# Patient Record
(demographics unavailable — no encounter records)

---

## 2020-05-31 NOTE — HP
PRIMARY CARE PHYSICIAN:  Dr. Hudson, [facility].



CHIEF COMPLAINT:  Chest pain.



HISTORY OF PRESENT ILLNESS:  The patient is a 79-year-old male with the past 
medical

history significant for coronary artery disease, CABG x8, last was in 2002,

cardiomyopathy, CHF, hypertension, hyperlipidemia, atrial flutter with an 
ablation

one year ago, obstructive sleep apnea, noncompliant on CPAP, obesity, and

degenerative joint disease, who presents for the above complaint.  The patient

reports that approximately 5:00 this afternoon, while working in his yard, he

developed a sudden onset of chest pain.  The chest pain was located between the

bilateral scapulas of his back and radiated to his chest.  He describes it as

tightness.  It was exacerbated with exertion and relieved by nitroglycerin.  He

reports some associated heart palpitations and tachycardia.  He palpated his 
pulse

and reports heart rate in the 120s.  He took two sublingual nitroglycerins, 
which

helped relieve his chest pain and he had his daughter take him to the ER in 
[place].

 The patient reports chronic swelling to his lower extremities.  He denies any

orthopnea.  He denies any cough or wheezing.  He denies any nausea, vomiting, or

diarrhea.  He denies any recent fever or chills.  He is scheduled for a 
stimulator

insertion on Monday, so he has been off his Plavix for the last 5 days.  He also

reports that this same thing happened to him in March 2020.  He was scheduled 
for

the stimulator to be placed and he had to stop his Plavix and he developed chest

pain.  In the ER in [place], EKG was done, found to be in atrial flutter, left

bundle branch block with some PVCs.  Initial troponin was 0.78, BNP of 384.  
Chest

x-ray showed some mild fluid volume overload.  The patient was given metoprolol 
5 mg

IV push, which dropped his blood pressure.  He was given a bolus of normal 
saline

250 mL.  He was given a full dose aspirin, Zofran, morphine, lovenox 1mg/kg and 
transferred to the

[place] ER.  In the [place] ER, second set troponins were ordered. 

PAST MEDICAL HISTORY:  

1. Coronary artery disease.

2. CHF, cardiomyopathy.

3. Hypertension.

4. Hyperlipidemia.

5. Atrial flutter with ablation one year ago.

6. Obstructive sleep apnea, noncompliant on CPAP.

7. Obesity with the BMI of greater than 30.

8. Degenerative joint disease.

9. BPH.



PAST SURGICAL HISTORY:  

1. Abdominal aortic aneurysm repair in 2018.

2. Coronary artery disease.

3. He had a CABG x8, two times, the last one was in 2002.

4. Right total knee.

5. Left ankle.

6. Hydrocele.



SOCIAL HISTORY:  The patient lives in [place] with his wife at home.  He 
ambulates

without assist.  He was never a smoker.  No ETOH or illicit drug use.  He did 
use

chewing tobacco.  He quit greater than 40 years ago. 



ALLERGIES:  THE PATIENT REPORTS ALLERGY TO HYDROCHLOROTHIAZIDE.



HOME MEDICATIONS:  Unable to reconcile home medications at bedside.



FAMILY HISTORY:  Contributory; positive for cardiac disease.



REVIEW OF SYSTEMS:  All other review of systems are negative unless otherwise 
noted

in the HPI. 



PHYSICAL EXAMINATION:

VITAL SIGNS:  Temperature 97.8, blood pressure 91/59, heart rate 65, 
respirations

19, SpO2 of 97% on room air.  Pain 0/10. 

CONSTITUTIONAL:  The patient is alert, oriented to person, place, and time.  No

acute distress. 

EYES:  PERRLA.  Extraocular muscles intact.  Sclerae nonicteric. 

ENT:  Nares are patent.  Oropharynx is clear.  Uvula midline.  Moist mucous

membranes. 

NECK:  Supple.  Trachea midline.  No JVD. 

CARDIOVASCULAR:  The patient appears to be in atrial flutter with PVCs, 
irregular

rate and rhythm, 3/6 murmur.  No rubs or gallops. 

RESPIRATORY/CHEST:  Respirations are even and nonlabored.  Clear to 
auscultation.

No wheezing, rhonchi, or rales. 

ABDOMEN:  Soft, nontender.  Active bowel sounds.  No rigidity.  No guarding.  No

abdominal bruit auscultated. 

BACK:  Full range of motion.  No CVA tenderness. 

UPPER EXTREMITIES:  Bilateral upper extremities; exam is normal.  Full range of

motion.  Palpable pedal and radial pulses.  Brisk cap refill. 

LOWER EXTREMITIES:  Lower extremities have 2+ pitting edema.  No open lesions.

Palpable pedal pulses.  Brisk cap refill. 

NEUROLOGIC:  GCS of 15.  Able to follow commands.  Oriented to person, place, 
and

time. 



LABORATORY DATA:  EKG was done, showed atrial flutter with a left bundle branch

block.  Troponin was 0.78.  , magnesium of 2.2.  Chest x-ray showed fluid

volume overload.  Sodium 143, potassium 3.4, chloride 107, CO2 of 26, BUN 23,

creatinine 0.99, glucose was 165, calcium 9.1, ALT 18, AST 21, ALP 92, lipase 
of 26.

 PT 13.3, INR 1.0.  WBC 7.2, hemoglobin 13.6, hematocrit 40.3, and platelets 
were

137.  TSH of 2.95. 



IMPRESSION AND PLAN:  

1. Non-ST elevation myocardial infarction.  Admit the patient to the telemetry

floor, inpatient status.  Expected length of stay greater than two midnights.  
The

patient has an extensive history of coronary artery disease and cardiomyopathy.
  He

presented with similar symptoms after being off his Plavix back in March.  The

patient's HEART score is 8.  On exam, the patient was in no distress.  Chest 
pain

was resolved.  We will continue aspirin.  We will trend troponins.  We will 
continue

Lovenox 1 mg/kg.  We will continue home dose statin.  We will consult 
Cardiology. 

2. Congestive heart failure exacerbation.  Chest x-ray showed fluid volume 
overload.

 BNP was 384.  The patient did have an echo in March 2020, showed an EF of 40% 
to

45% with some inferior wall hypokinesis and diastolic dysfunction.  Upon exam, 
the

patient was in no acute distress and the patient did become hypotensive after

receiving metoprolol in the ER in [place].  So for now, we will not diurese.  We

will maintain n.p.o. status.  We will consult Cardiology to perform daily 
weights

and we will fluid restrict and we will consult heart failure clinic and rehab. 

3. Atrial flutter and RVR.  The patient presented with atrial flutter and RVR 
and

was given metoprolol and is now rate controlled in the 90s to 100s.  We will

continue cardiac monitor.  The patient is anticoagulated on Lovenox. 

4. Coronary artery disease.  The patient has an extensive history of CABG with

8-vessel bypass, two separate surgeries, last one was in 2002. 

5. Hyperlipidemia.  The patient had a fasting lipid panel in March 2020.  We 
will

continue home dose statin. 

6. History of atrial flutter.

7. Obesity with a BMI of 31.9.

8. Elevated glucose.  We will check hemoglobin A1c.

9. Protonix for GI prophylaxis.  The patient is anticoagulated on 1 mg/kg 
Lovenox.

10. The patient is a full code.  POA is Adore Mantilla, his spouse at [phone]
[phone].

11. Discussed the case with  __________.







Job ID:  [id number]



DRISS

## 2020-05-31 NOTE — CON
DATE OF CONSULTATION:  05/31/2020



INDICATION FOR CONSULTATION:  A 79-year-old patient with non-ST-segment elevation

myocardial infarction. 



HISTORY OF PRESENT ILLNESS:  This is a very pleasant gentleman I have taken care for

many years, who has a history of coronary artery disease.  He has undergone

angioplasty and stent placement.  He has also undergone bypass surgery twice.  He

underwent cardiac catheterization in March of this year.  He was found to have

patent saphenous vein graft to the left anterior descending artery, diagonal branch,

I believe, obtuse marginal branch and the distal right coronary artery.  His native

vessels are essentially 100% occluded except that he does have a small right

ventricular branch which still remains patent but has diffuse disease proximal to

the takeoff of this vessel.  He has had also an echocardiogram which shows ejection

fraction of about 45% to 50%.  He has been treated with medical management for his

coronary artery disease.  He has also needed to undergo a pain stimulator implant

and his Plavix was stopped about 5 days ago.  He noticed actually about 2 weeks ago,

he started having some rapid heart rates, which would come and go.  He then did not

have any significant shortness of breath, but did note he had occasional chest

discomfort and yesterday the day before he started noticing more increased chest

discomfort when he was actually at rest, but the heart rate was also beating

rapidly.  The blood pressure has remained relatively stable through this except

sometimes in the morning he said his blood pressure may be elevated.  The only

change in his medications has been that he has recently started on tamsulosin and

also his diuretics were increased due to lower extremity edema and about 5 days ago

he stopped taking his Plavix in anticipation of the implant of the stimulator to

decrease his pain in his hips and then after about 1 or 2 days after stopping the

Plavix, he developed episodes of chest discomfort.  His EKG shows also atrial

flutter.  The heart rate is slightly lower, but it is almost 140 to 150 beats per

minute and appears to be atrial flutter, is a very regular rhythm.  No discernible P

waves, but they most likely are caught up in the QRS complex.  He has undergone

ablation in the past for atrial flutter about 2 years ago and most likely will need

to undergo a repeat ablation and would try to control the heart rate at this time as

this is also causing him further ischemia.  His cardiac enzymes are positive for

non-ST-segment elevation myocardial infarction and they have actually been trending

downward since the patient has been admitted to the hospital.  His troponin I on

admission was 1.33, decreased to 1.1, and now is down to 0.866 at 8:45 this morning.

 At this time, he has no chest pain, but he did have some chest pain earlier today,

which was relieved by nitroglycerin, but he has been tachycardic with the atrial

flutter.  At this time, we will control the heart rate and we will continue the

nitrates, and I will start him on IV digoxin in order to slow the heart rate down.

If this is not successful, then we will need to start on IV diltiazem.  His blood

pressure is slightly on the low side but should be able to tolerate the medications. 



PAST MEDICAL HISTORY:  Significant for the coronary artery disease as noted above,

mild cardiomyopathy, history of atrial flutter with an ablation in 2018 by Dr. Brumfield.

 He has had an aortic aneurysm repair by Dr. Coleman in February of 2018.  He has had

a left knee surgery with a total left ankle arthroplasty in 2013.  He has had skin

cancers removed.  He had knee surgery in 2011.  His bypass surgery was in 1986

originally.  He had saphenous vein graft to the left anterior descending artery, to

a ramus branch, to the 2nd obtuse marginal branch, and to the distal right coronary

artery.  He underwent angioplasty and stent placement to the mid to distal left

anterior descending artery in 1977 with stent placed into the vein graft of the

proximal left anterior descending artery.  He had a redo CABG in April of 2002 with

a LIMA to the left anterior descending artery, left radial to the distal left

anterior descending artery, right greater saphenous vein to the diagonal branch, and

right greater saphenous vein to the obtuse marginal branch of left circumflex.  This

was in 2002.  In March of 2020, he showed patent grafts with ejection fraction of

45% to 50%.  He had hypokinesis of the anterior wall and apex.  He has

hypercholesterolemia.  He has mild decrease in left ventricular systolic function.

He has what appears to be probable diastolic dysfunction. 



He has had a left arm fracture.  He also has sleep apnea.  He uses CPAP mask.  He

has had some history of atrial fibrillation apparently in 2013 and he may have

undergone ablation for this, unclear on that exactly. 



FAMILY HISTORY:  Noncontributory.



SOCIAL HISTORY:  He is a nonsmoker.



ALLERGIES:  NONE.



MEDICATIONS:  Include 

1. Folic acid 800 mcg tablets once a day.

2. Loratadine 10 mg once a day.

3. Ezetimibe 10 mg a day.

4. Aspirin 81 mg a day.

5. Plavix 75 mg a day, which has been on hold for the last several days, but has now

been restarted since the patient was admitted. 

6. Amlodipine 5 mg once a day.

7. Coreg 3.125 mg, he was taking half a tablet twice a day.

8. Atorvastatin 40 mg once a day.

9. Furosemide 40 mg once a day until recently he was taking it twice a day for

several days and then resumed it back to 40 mg once a day. 

10. Nitroglycerin as needed for chest discomfort.

11. Isosorbide mononitrate 30 mg once a day, and then I believe he had increased it

up to twice a day. 

12. Vitamin C.

13. CoQ10.

14. Vitamin D.

15. Clonidine 0.1 mg as needed.

16. Entresto 97/103 one tab b.i.d.



REVIEW OF SYSTEMS:  A 12-point review of systems unremarkable except what is noted

in the history of present illness.  He has had some lower extremity edema recently,

but these resolved after increasing the diuretics.  He has also complained of chest

pains and shortness of breath and hypertension. 



PHYSICAL EXAMINATION:

GENERAL:  Reveals a well-developed, well-nourished, very pleasant gentleman who is

in no acute distress at this time.  He recently was given nitroglycerin and had

resolution of his chest discomfort. 

VITAL SIGNS:  Blood pressure is 119/66, heart rate is 130 and is regular. 

HEENT:  Shows the head to be normocephalic and atraumatic. 

NECK:  Carotid pulses are present.  There were no bruits. 

CHEST:  Actually clear to auscultation, did not have any significant rales, rhonchi,

or wheezing. 

CARDIOVASCULAR:  Reveals a tachycardia, regular rhythm.  I cannot hear an S3 nor an

S4.  He has a soft systolic murmur at the apex and also at the upper sternal border.

 He has a well-healed midline surgical incision after median sternotomy. 

ABDOMEN:  Obesity with positive bowel sounds.  No organomegaly or masses or

tenderness were noted. 

EXTREMITIES:  No clubbing, cyanosis, or edema.  He has well-healed surgical incision

of the left lower extremity after saphenous vein graft retrieval.  The right lower

extremity has mild edema.  Positive pulses present on the right side was normal of

his feet.  On the left side, the pedal pulse was somewhat decreased. 

NEUROLOGIC:  The patient is fully intact.  He has normal strength, normal tone, and

mentation is normal. 

PSYCHOSOCIAL: He was normal.   

SKIN:  Also warm and dry.



LABORATORY DATA:  Sodium of 142, potassium is 3.5, chloride was 108, and BUN was 21,

creatinine 0.9, glucose was 128, calcium was 8.3.  As noted above, troponin I was

1.3 when he arrives, now decreased down to 0.866.  His hematology shows WBC is 6.4,

hemoglobin is 12.8, platelet count 133,000.  EKG shows atrial flutter with a rapid

ventricular response. 



IMPRESSION:  

1. Atrial flutter with rapid ventricular response.  We will add digoxin and also we

will add potassium in this gentleman since potassium level slightly low.  If we are

unable to control the flutter with the digoxin, then we will also start him on

diltiazem as long as his blood pressure tolerates.  We may need to increase the dose

of the beta-blockers.  Also, his blood pressure has been somewhat on the low side in

the past with increasing the beta-blockers, but we would need to try to increase the

dose of the beta-blockers.  We will continue his other medications. 

2. History of coronary artery disease and also I believe the flutter with his

tachycardia has caused the slight bump in his cardiac enzymes and simulating a

non-ST-segment elevation myocardial infarction or type 2 myocardial infarction due

to demand ischemia. 

3. Coronary artery disease.  He has underwent cardiac catheterization in March of

this year and bypasses were still patent.  At this time, we will continue the

medications with his nitrates.  We will review his medications again, ensure that he

is on nitrates and can also continue to give sublingual nitroglycerin as needed.  I

believe once the heart rate is under better control, the pain will also be under

better control.  We will ask Dr. Brumfield to see the patient tomorrow in consultation.

He may need to undergo a repeat ablation of the atrial flutter.  We may be able to

eventually get the patient off the Plavix in order that he can have his stimulator

implanted for his pain. 

4. History of hypertension.  He is actually on the low side at this time.  We will

need to continue to adjust the medications. 

5. Mild cardiomyopathy.  Would continue the Entresto at this time if he is able to

tolerate the medication as far as his blood pressure is concerned. 

6. Dyslipidemia.  We will continue his statin medications.  At this time, we will

continue to monitor the patient very carefully, but at this time it is most

pertinent to slow the heart rate down and to avoid further ischemia. 







Job ID:  [id number]

## 2020-06-01 NOTE — PDOC.HOSPP
- Subjective


Encounter Date: 06/01/20


Encounter Time: 16:45


Subjective: 





pt up in bed feels well. 





- Objective


Vital Signs & Weight: 


 Vital Signs (12 hours)











  Temp Pulse Resp BP Pulse Ox


 


 06/01/20 15:21      99


 


 06/01/20 15:19  97.8 F  65  14  139/76  99


 


 06/01/20 11:44  99.0 F  58 L  18  120/77  97


 


 06/01/20 07:51  98.1 F  90  18  143/69 H  96








 Weight











Weight                         257 lb














Result Diagrams: 


 05/31/20 10:44





 05/31/20 10:44





Hospitalist ROS





- Review of Systems


Respiratory: denies: cough, dry, shortness of breath, hemoptysis, SOB with 

excertion, pleuritic pain, sputum, wheezing, other


Cardiovascular: denies: chest pain, palpitations, orthopnea, paroxysmal noc. 

dyspnea, edema, light headedness, other


Gastrointestinal: denies: nausea, vomiting, abdominal pain, diarrhea, 

constipation, melena, hematochezia, other





- Medication


Medications: 


Active Medications











Generic Name Dose Route Start Last Admin





  Trade Name Freq  PRN Reason Stop Dose Admin


 


Acetaminophen  650 mg  05/31/20 03:11  06/01/20 08:46





  Tylenol  PO   650 mg





  Q4H PRN   Administration





  Headache/Fever/Mild Pain (1-3)   





     





     





     


 


Aspirin  81 mg  05/31/20 09:00  06/01/20 08:45





  Ecotrin  PO   81 mg





  DAILY MICHAEL   Administration





     





     





     





     


 


Atorvastatin Calcium  40 mg  05/31/20 21:00  05/31/20 21:21





  Lipitor  PO   40 mg





  QPM MICHAEL   Administration





     





     





     





     


 


Carvedilol  1.5625 mg  05/31/20 09:00  06/01/20 08:45





  Coreg  PO   1.5625 mg





  BID MICHAEL   Administration





     





     





     





     


 


Enoxaparin Sodium  110 mg  05/31/20 21:00  06/01/20 08:47





  Lovenox  SC   110 mg





  0900,2100 MICHAEL   Administration





     





     





     





     


 


Nitroglycerin  0.4 mg  05/31/20 03:06  05/31/20 06:42





  Nitrostat  PO   0.4 mg





  Q5MIN PRN   Administration





  Chest Pain   





     





     





     


 


Pantoprazole Sodium  40 mg  05/31/20 09:00  06/01/20 08:49





  Protonix  IVP   40 mg





  DAILY MICHAEL   Administration





     





     





     





     


 


Potassium Chloride  20 meq  05/31/20 17:00  06/01/20 16:49





  K-Dur  PO  06/03/20 08:00  20 meq





  BID-WM MICHAEL   Administration





     





     





     





     


 


Sodium Chloride  10 ml  05/31/20 03:35  05/31/20 09:59





  Normal Saline Pf  FS   10 ml





  PRN PRN   Administration





  RECONSTITUTION   





     





     





     














- Exam


Neck: negative: supple, symmetric, no JVD, no thyromegaly, no lymphadenopathy, 

no carotid bruit, JVD


Heart: negative: RRR, no murmur, no gallops, no rubs, normal peripheral pulses, 

irregular, diminshed peripheral pulses, murmur present, II/IV, III/IV


Respiratory: negative: CTAB, no wheezes, no rales, no ronchi, normal chest 

expansion, no tachypnea, normal percussion, rales, rhonchi, tachypneic, wheezes





Hosp A/P


(1) Chest pain


Code(s): R07.9 - CHEST PAIN, UNSPECIFIED   Status: Acute   





(2) Atrial flutter


Code(s): I48.92 - UNSPECIFIED ATRIAL FLUTTER   Status: Acute   





(3) Elevated troponin


Code(s): R74.8 - ABNORMAL LEVELS OF OTHER SERUM ENZYMES   Status: Acute   





(4) HTN (hypertension)


Code(s): I10 - ESSENTIAL (PRIMARY) HYPERTENSION   Status: Acute   





(5) CLAY (obstructive sleep apnea)


Code(s): G47.33 - OBSTRUCTIVE SLEEP APNEA (ADULT) (PEDIATRIC)   Status: Acute   





- Plan





 He is currently rate controlled. will continue AC. pt to undergo DCCV

## 2020-06-01 NOTE — CON
DATE OF CONSULTATION:  06/01/2020



HISTORY OF PRESENT ILLNESS:  I am seeing Mr. Mantilla at our [facility] Telemetry Floor as an Electrophysiology consultant for the following

problems; 

1. Recurrent atrial arrhythmias:

    a. History of atrial flutter.

    b. Status post CTI ablation on 03/09/2018.

c. Recurrent atrial flutter, cannot determine wheter atypical or typical - 
isthmus dependent 

on presentation with rapid ventricular rate

2. Coronary artery disease:

a. Three-vessel disease, status post repeated bypass surgery, most recently in 
2002. 

3. CHF with cardiomyopathy, likely ischemic:

    a. Reduced LVEF at 40% to 45% on echo 03/09/2020.

4. Peripheral vascular disease:

    a. Status post aortic abdominal aneurysm endovascular repair on 02/05/2018.

5. Hypertension and hyperlipidemia.

6. History of obstructive sleep apnea, on CPAP.

7. Elevated BMI greater than 30.

8. BPH and degenerative joint disease.



ALLERGIES:  HYDROCHLOROTHIAZIDE.



MEDICATIONS:  At home included;

1. Imdur.

2. Lipitor.

3. Coenzyme Q10.

4. Folic acid.

5. Omega-3 fatty acid.

6. Vitamin D3.

7. Zyrtec.

8. Aspirin.

9. Vitamin C.

10. Lasix.

11. Nitrostat.

12. Norvasc.

13. Ezetimibe.

14. Entresto.

15. Coreg.

16. Clopidogrel.



SUBJECTIVE:  Mr. Mantilla is complaining of recurrent chest tightness sensation 
for

the last couple of weeks.  This is a new issue for him, and eventually, brought 
into

the ER, was admitted on the 31st, and was noted to have elevated troponin 
levels.

He also had mildly elevated BNP levels.  He was noted to be in atrial flutter 
with

rapid rate, 2:1 AV conduction, about 125 beats per minute was seen.  He was 
started

on Lovenox and resumed on his Plavix.  Dr. Jauregui evaluated the patient and IV

Cardizem was used to control the ventricular rates.  Currently, he is feeling

better, but still has ongoing atrial flutter.  He has no PND or orthopnea at 
this

time.  No stroke-like symptoms.  No neurological deficits.  No fever, chills, or

cough. 



REVIEW OF SYSTEMS:  Rest of 12-point system otherwise unremarkable.



PAST HISTORY:  As above.



SURGICAL HISTORY:  Significant for aortic abdominal aneurysm repair in 2018;

coronary artery bypass grafting surgery, most recently in 2002; total knee

replacement; left ankle surgery, hydrocele repair. 



SOCIAL HISTORY:  The patient lives in [place] with his wife at home.  Ambulates

without assistance.  He never smoked.  Denies EtOH or drug abuse.  He did chew

tobacco in the past, quit over 40 years ago. 



FAMILY HISTORY:  Significant for coronary artery disease.



OBJECTIVE DATA:  VITAL SIGNS:  Blood pressure is 142/69, heart rate 90, 
respirations

18, and temperature 98.1 degrees Fahrenheit. 

GENERAL:  Alert and oriented man, with elevated BMI, in no apparent distress. 

NECK:  Supple.  Jugular veins are not distended. 

CHEST:  Coarse without crackles.  Midsternal scar is noted. 

HEART:  Sounds are irregularly irregular.  S1 and S2 are variable.  No murmur or

gallop. 

ABDOMEN:  Benign.  Bowel sounds are positive. 

EXTREMITIES:  Lower extremities without edema, clubbing, or cyanosis.



DATABASE:  EKG reviewed, reveals atrial flutter with 2:1 AV conduction, left

bundle-branch block pattern.  Subsequent telemetry strips revealed more better

control of atrial flutter with up to 4:1 AV conduction.



LABORATORY DATA:  White cell count is 6.4, hemoglobin 12.8, platelet count is 
133.

Sodium 142, potassium 3.5, BUN is 21, creatinine 0.9.  Troponin-I is 1.335, 1.14
,

and 0.866 consecutively.  The BNP was over 300, at 384.  Chest x-ray was 
suggestive

of fluid overload on admission. 



ASSESSMENT AND PLAN:  Mr. Mantilla is a 79-year-old man with prior history of

coronary and aortic vessel disease, also had recurrent atrial arrhythmias, in 
the

past he had a typical isthmus-dependent atrial flutter, which prompted the CTI

ablation in the past.  Now, he is back in atrial flutter, which is possibly 
typical

isthmus dependent again in morphology.  Ventricular rates are better controlled 
with

IV diltiazem, but the atrial flutter still continues.  He has been 
intermittently on

aspirin and Plavix, not fully anticoagulated. 



We discussed treatment options, MARY JO-guided cardioversion would be reasonable to

restore sinus rhythm.  Recurrence on the other hand is fairly high and may 
benefit

from consideration for an ablation procedure.  This could include the ablation 
of

the cavotricuspid isthmus, but possibly pulmonary venous isolation procedure 
also

could be contemplated.  He is agreeable to this.  I discussed the pros and cons
, the

rationale for each procedure.  He understands the risk of infection, bleeding,

tamponade, stroke, and recurrence. 



Thank you for allowing me to participate in the care of this patient.







Job ID:  [id number]



[id number]

## 2020-06-01 NOTE — PDOC.CPN
- Subjective


Date: 06/01/20


Time: 13:07


Interval history: 





The pt seen and examined.  No overnight events.  No cardiac complaints.  Only 

complaint he has this AM was back pain





- Objective


Allergies/Adverse Reactions: 


 Allergies











Allergy/AdvReac Type Severity Reaction Status Date / Time


 


hydrochlorothiazide Allergy   Verified 03/09/20 05:03











Visit Medications: 


 Current Medications





Acetaminophen (Tylenol)  650 mg PO Q4H PRN


   PRN Reason: Headache/Fever/Mild Pain (1-3)


   Last Admin: 06/01/20 08:46 Dose:  650 mg


Aspirin (Ecotrin)  81 mg PO DAILY [facility]


   Last Admin: 06/01/20 08:45 Dose:  81 mg


Atorvastatin Calcium (Lipitor)  40 mg PO QPM [facility]


   Last Admin: 05/31/20 21:21 Dose:  40 mg


Bisacodyl (Dulcolax)  10 mg PO DAILYPRN PRN


   PRN Reason: Constipation


Carvedilol (Coreg)  1.5625 mg PO BID [facility]


   Last Admin: 06/01/20 08:45 Dose:  1.5625 mg


Enoxaparin Sodium (Lovenox)  110 mg SC 0900,2100 [facility]


   Last Admin: 06/01/20 08:47 Dose:  110 mg


Morphine Sulfate (Morphine)  4 mg SLOW IVP Q4H PRN


   PRN Reason: Chest Pain


Nitroglycerin (Nitrostat)  0.4 mg PO Q5MIN PRN


   PRN Reason: Chest Pain


   Last Admin: 05/31/20 06:42 Dose:  0.4 mg


Pantoprazole Sodium (Protonix)  40 mg IVP DAILY [facility]


   Last Admin: 06/01/20 08:49 Dose:  40 mg


Potassium Chloride (K-Dur)  20 meq PO BID-[facility]


   Stop: 06/03/20 08:00


   Last Admin: 06/01/20 08:46 Dose:  20 meq


Senna/Docusate Sodium (Senokot S)  2 tab PO BIDPRN PRN


   PRN Reason: Constipation


Sodium Chloride (Flush - Normal Saline)  10 ml IVF Q12HR PRN


   PRN Reason: Saline Flush


Sodium Chloride (Normal Saline Pf)  10 ml FS PRN PRN


   PRN Reason: RECONSTITUTION


   Last Admin: 05/31/20 09:59 Dose:  10 ml








Vital Signs & Weight: 


 Vital Signs











  Temp Pulse Resp BP Pulse Ox


 


 06/01/20 11:44  99.0 F  58 L  18  120/77  97


 


 06/01/20 07:51  98.1 F  90  18  143/69 H  96


 


 06/01/20 05:04      97


 


 06/01/20 04:02  98.0 F  57 L  18  131/69  97








 











Weight                         257 lb

















- Physical Exam


General: alert & oriented x3


HEENT: mucus membranes moist


Neck: supple neck


Cardiac: irregularly regular


Lungs: decreased breath sounds


Neuro: cranial nerve 2-12 intact





- Labs


Result Diagrams: 


 05/31/20 10:44





 05/31/20 10:44


 Troponin/CKMB











CK-MB (CK-2)  8.3 ng/mL (0-6.6)  H*  05/31/20  02:03    


 


Troponin I  0.866 ng/mL (< 0.028)  H*  05/31/20  08:42    














- Telemetry


Supraventricular conduction: atrial flutter





- Assessment/Plan


Assessment/Plan: 





1. Recurrent Aflutter with RVR (s/ CTI in 03/2018) - well controlled HR with 

Cardizem drip; Lovenox BID; plan for MARY JO/DCCV


2. CAD with hx of CABG and s/p LCH in 03/2020 with patent graft with EF 45-50%, 

hypokinetic inferior wall and apex - 


3. Chronic Systolic HF - stable; on Coreg; may resume Entresto once his BP is 

more stable


4. Ischemic CMY - 


5. s/p AAA repair in 02/2018 - 


6. HTN - stable


7. HLD - statin


8. Sleep apnea with Cpap


MAR reviewed

## 2020-06-02 NOTE — PQF
LACHELLE DEY JOSEFINA RAUSCH

[id number]                                                             [facility]

[id number]                             

                                   

CLINICAL DOCUMENTATION IMPROVEMENT CLARIFICATION FORM:  ICD-10 Updated



PLEASE DO AN ADDENDUM TO THE PROGRESS NOTE WITH ANY DOCUMENTATION UPDATES OR 
ADDITIONS AND CARRY THROUGH TO DC SUMMARY.   THANK YOU.



DATE:      6/2/2020                                    ATTN:DR. COSME



Please exercise your independent, professional judgment in responding to the 
clarification form. Clinical indicators are provided on the bottom of this form 
for your review.



Please check appropriate box(s):



 CONGESTIVE HEART FAILURE:



A. ACUITY

              

       [ x ] Acute on Chronic          [  ] Chronic



B.  TYPE

     [  ] Systolic / HFrEF      [  ] Diastolic / HFpEF       [ x ] Combined 
Systolic / Diastolic

   



[  ] Other diagnosis ___________

[  ] Unable to determine



In addition, please specify:

Present on Admission (POA):  [  ] Yes             [x  ] No             [  ] 
Unable to determine



For continuity of documentation, please document condition throughout progress 
notes and discharge summary.  Thank You.



CLINICAL INDICATORS - SIGNS / SYMPTOMS / LABS  / RESULTS AND LOCATION IN EMR



6/2  BNP   772.5



5/31  ED REPORT: COMING FROM [facility] FOR CP. A FLUTTER WITH RVR.  CHF 
EXACERBATION AND NSTEMI. ED FINAL PHYSICIAN DX: NSTEMI, CHF



5/31 H&P (CANDY)  IMPRESSION AND PLAN: 2).  CONGESTIVE HEART FAILURE 
EXACERBATION.  CXR SHOWED FLUID VOLUME OVERLOAD.  BNP .  THE PATIENT DID 
HAVE AN ECHO IN MARCH 2020, SHOWED AN EF OF 40% TO 45% WITH SOME INFERIOR WALL 
HYPOKINESIS AND DIASTOLIC DYSFUNCTION,  



6/1 PN  (ARATA) A/P: 3). CHRONIC SYSTOLIC HEART FAILURE.



RISK

HX OF CHF, CARDIOMYOPATHY ( H&P/MICHELLE) 5/31



TREATMENTS

LASIX IV ( 6/2)

CARDIOLOGY  CONSULT (5/31) 



THANK YOU!  CHRISTOPH 



(This form is maintained as a part of the permanent medical record)

 2015 [employer].  All Rights Reserved

EMBER [email or web address]    Cell Phone: [phone]

                                                              

[facility] Center[facility]

## 2020-06-02 NOTE — RAD
PORTABLE CHEST:

 

Date:  06/02/2020

 

HISTORY:  

Shortness of breath. 

 

COMPARISON:  

03/14/2018 chest x-ray is the most recent study available. 

 

FINDINGS:

Heart size is enlarged with postop sternotomy changes. Mild vascular engorgement noted. Slightly incr
eased parenchymal lung markings, some of which are felt to be chronic in nature. There is more promin
ent density in the right lower lobe. I cannot exclude a coexistent infiltrate. 

 

IMPRESSION: 

Cardiomegaly with mild pulmonary vascular prominence. There are some chronic-appearing lung changes. 
Questionable early infiltrate in the right base. 

 

 

POS: EVARISTO

## 2020-06-02 NOTE — PDOC.CPN
- Subjective


Date: 06/02/20


Time: 08:00


Interval history: 





The pt seen and examined.  No overnight events.  No cardiac complaints





- Objective


Allergies/Adverse Reactions: 


 Allergies











Allergy/AdvReac Type Severity Reaction Status Date / Time


 


hydrochlorothiazide Allergy   Verified 03/09/20 05:03











Visit Medications: 


 Current Medications





Acetaminophen (Tylenol)  650 mg PO Q4H PRN


   PRN Reason: Headache/Fever/Mild Pain (1-3)


   Last Admin: 06/01/20 23:48 Dose:  650 mg


Aspirin (Ecotrin)  81 mg PO DAILY [facility]


   Last Admin: 06/02/20 09:31 Dose:  81 mg


Atorvastatin Calcium (Lipitor)  40 mg PO QPM [facility]


   Last Admin: 06/01/20 20:57 Dose:  40 mg


Benzonatate (Tessalon)  100 mg PO TIDPRN PRN


   PRN Reason: Cough


   Last Admin: 06/02/20 09:31 Dose:  100 mg


Bisacodyl (Dulcolax)  10 mg PO DAILYPRN PRN


   PRN Reason: Constipation


   Last Admin: 06/02/20 09:31 Dose:  10 mg


Carvedilol (Coreg)  1.5625 mg PO BID [facility]


   Last Admin: 06/02/20 09:30 Dose:  1.5625 mg


Enoxaparin Sodium (Lovenox)  110 mg SC 0900,2100 [facility]


   Last Admin: 06/02/20 09:27 Dose:  110 mg


Morphine Sulfate (Morphine)  4 mg SLOW IVP Q4H PRN


   PRN Reason: Chest Pain


   Last Admin: 06/02/20 09:33 Dose:  4 mg


Nitroglycerin (Nitrostat)  0.4 mg PO Q5MIN PRN


   PRN Reason: Chest Pain


   Last Admin: 06/02/20 09:35 Dose:  0.4 mg


Pantoprazole Sodium (Protonix)  40 mg IVP DAILY [facility]


   Last Admin: 06/02/20 09:29 Dose:  40 mg


Potassium Chloride (K-Dur)  20 meq PO BID-[facility]


   Stop: 06/03/20 08:00


   Last Admin: 06/02/20 16:01 Dose:  20 meq


Senna/Docusate Sodium (Senokot S)  2 tab PO BIDPRN PRN


   PRN Reason: Constipation


Sodium Chloride (Flush - Normal Saline)  10 ml IVF Q12HR PRN


   PRN Reason: Saline Flush


Sodium Chloride (Normal Saline Pf)  10 ml FS PRN PRN


   PRN Reason: RECONSTITUTION


   Last Admin: 06/02/20 09:30 Dose:  10 ml








Vital Signs & Weight: 


 Vital Signs











  Temp Pulse Pulse Pulse Resp BP BP


 


 06/02/20 15:00  99.5 F  82    18  


 


 06/02/20 11:28  98.2 F  88    14  


 


 06/02/20 10:30    77  98   138/84  136/89


 


 06/02/20 08:17       


 


 06/02/20 07:22  100.4 F H  80    16  


 


 06/02/20 06:59       














  BP Pulse Ox Pulse Ox Pulse Ox


 


 06/02/20 15:00  147/79 H  94 L  


 


 06/02/20 11:28  173/80 H  96  


 


 06/02/20 10:30    95  96


 


 06/02/20 08:17   93 L  


 


 06/02/20 07:22  141/76 H  93 L  


 


 06/02/20 06:59   95  








 











Weight                         255 lb 12.8 oz

















- Physical Exam


General: alert & oriented x3


HEENT: mucus membranes moist


Neck: supple neck


Cardiac: regular rate and rhythm, S1/S2


Lungs: decreased breath sounds


Neuro: cranial nerve 2-12 intact


Extremities: no edema





- Labs


Result Diagrams: 


 06/02/20 09:48





 06/02/20 04:17


 Troponin/CKMB











CK-MB (CK-2)  8.3 ng/mL (0-6.6)  H*  05/31/20  02:03    


 


Troponin I  0.866 ng/mL (< 0.028)  H*  05/31/20  08:42    














- Telemetry


Sinus rhythms and dysrhythmias: sinus tachycardia





- Assessment/Plan


Assessment/Plan: 





1. Recurrent Aflutter with RVR (s/p CTI in 03/2018) - s/p MARY JO/DCCV on 06/01/2020

; remains in SR with Cardizem drip; Lovenox BID; Plan for redo AFlutter RFA by 

Dr Brumfield tomorrow


2. CAD with hx of CABG and s/p LCH in 03/2020 with patent graft with EF 45-50%, 

hypokinetic inferior wall and apex - he complained of CP this AM which resolved 

after he received NTG and morphine; he walked with PT without any cardiac 

complaints


3. Chronic Systolic HF with EF 40-45% in 03/2020 - stable; on Coreg which will 

be increased to 3.125mg BID; may resume Entresto once his BP is more stable


4. Ischemic CMY 


5. s/p AAA repair in 02/2018


6. HTN - stable


7. HLD - statin


8. Sleep apnea with Cpap


MAR reviewed





* Echo in 03/2020 with EF 40-45%, grade I dd, inferior wall hypokinesis, mod DEANDRA

, mild LAE, mild TR and ID








Pt. seen and eval. by me.I agree with the A/P by the NP. No further CP since 

earlier today. Maintaining NSR. Chest clear. RRR. For A-flutter ablation 

tomorrow.  jimmie

## 2020-06-02 NOTE — PDOC.EP
- Subjective


Date: 06/02/20


Time: 14:57


Interval History: 





 Electrophysiology follow-up note for atrial arrhythmia management.   Patient 

feels well after MARY JO guided cardioversion yesterday.   He wishes to move 

forward with ablation tomorrow.  he voices no cardiac concerns or complaints 

today and is not having any evident bleeding dyscrasias for the blood thinning 

medication.





- Review of Systems


Constitutional: denies: chills, fever, malaise, sweats, weakness


Respiratory: denies: cough, shortness of breath, sputum, wheezing


Cardiology: denies: chest pain, heart racing, light headedness, palpitations


Gastrointestinal: denies: abdominal pain, constipation, diarrhea, nausea, 

vomitting


Musculoskeletal: denies: unstable gait, falls, shoulder pain





- Objective


Allergies/Adverse Reactions: 


 Allergies











Allergy/AdvReac Type Severity Reaction Status Date / Time


 


hydrochlorothiazide Allergy   Verified 03/09/20 05:03











 Current Medications





Acetaminophen (Tylenol)  650 mg PO Q4H PRN


   PRN Reason: Headache/Fever/Mild Pain (1-3)


   Last Admin: 06/01/20 23:48 Dose:  650 mg


Aspirin (Ecotrin)  81 mg PO DAILY [facility]


   Last Admin: 06/02/20 09:31 Dose:  81 mg


Atorvastatin Calcium (Lipitor)  40 mg PO QPM [facility]


   Last Admin: 06/01/20 20:57 Dose:  40 mg


Benzonatate (Tessalon)  100 mg PO TIDPRN PRN


   PRN Reason: Cough


   Last Admin: 06/02/20 09:31 Dose:  100 mg


Bisacodyl (Dulcolax)  10 mg PO DAILYPRN PRN


   PRN Reason: Constipation


   Last Admin: 06/02/20 09:31 Dose:  10 mg


Carvedilol (Coreg)  1.5625 mg PO BID [facility]


   Last Admin: 06/02/20 09:30 Dose:  1.5625 mg


Enoxaparin Sodium (Lovenox)  110 mg SC 0900,2100 [facility]


   Last Admin: 06/02/20 09:27 Dose:  110 mg


Morphine Sulfate (Morphine)  4 mg SLOW IVP Q4H PRN


   PRN Reason: Chest Pain


   Last Admin: 06/02/20 09:33 Dose:  4 mg


Nitroglycerin (Nitrostat)  0.4 mg PO Q5MIN PRN


   PRN Reason: Chest Pain


   Last Admin: 06/02/20 09:35 Dose:  0.4 mg


Pantoprazole Sodium (Protonix)  40 mg IVP DAILY [facility]


   Last Admin: 06/02/20 09:29 Dose:  40 mg


Potassium Chloride (K-Dur)  20 meq PO BID- [facility]


   Stop: 06/03/20 08:00


   Last Admin: 06/02/20 09:30 Dose:  20 meq


Senna/Docusate Sodium (Senokot S)  2 tab PO BIDPRN PRN


   PRN Reason: Constipation


Sodium Chloride (Flush - Normal Saline)  10 ml IVF Q12HR PRN


   PRN Reason: Saline Flush


Sodium Chloride (Normal Saline Pf)  10 ml FS PRN PRN


   PRN Reason: RECONSTITUTION


   Last Admin: 06/02/20 09:30 Dose:  10 ml








Vital Signs & Weight: 


 Vital Signs











  Temp Pulse Pulse Pulse Resp BP BP


 


 06/02/20 11:28  98.2 F  88    14  


 


 06/02/20 10:30    77  98   138/84  136/89


 


 06/02/20 08:17       


 


 06/02/20 07:22  100.4 F H  80    16  


 


 06/02/20 06:59       


 


 06/02/20 03:32  98.4 F  72    20  














  BP Pulse Ox Pulse Ox Pulse Ox


 


 06/02/20 11:28  173/80 H  96  


 


 06/02/20 10:30    95  96


 


 06/02/20 08:17   93 L  


 


 06/02/20 07:22  141/76 H  93 L  


 


 06/02/20 06:59   95  


 


 06/02/20 03:32  124/70  95  








 











Weight                         255 lb 12.8 oz














I/O: 


 I/O











 06/01/20 06/02/20 06/03/20





 06:59 06:59 06:59


 


Intake Total  480 


 


Output Total  200 


 


Balance  280 














- Quality Measures


Condition: Atrial Fibrillation/Flutter (hx or current) (lovenox)





- Physical Exam


General: alert & oriented x3, appears well, no apparent distress, speech clear, 

affect appropriate


HEENT: mucus membranes moist, normocephaly, EOMI


Neck: supple neck, no JVD/HJR, no lymphadenopathy


Cardiology: regular rate and rhythm, PMI nondisplaced


Lungs: clear to auscultation, normal breath sounds, no wheeze, rales, rhonchi


Neurology: cranial nerve 2-12 intact, grossly intact, no lateralizing findings


Abdomen: unremarkable, active bowel sounds, no pulsations/bruits, HJR negative


Extremities: dry, strong pulses, warm





- Chadsvasc Risk factors


Congestive heart failure: 1


Hypertension: 1


Age >75: 2


Vascular disease: 1


Risk Score: 5





- Labs


Result Diagrams: 


 06/02/20 09:48





 06/02/20 04:17





- EKG Interpretation


EKG Method: Telemetry


EKG shows: Sinus rhythm





- Assessment/Plan


Assessment/Plan: 





1. Recurrent atrial arrhythmias:


    a. History of atrial flutter.


    b. Status post CTI ablation on 03/09/2018.


    c. Recurrent atrial flutter, possibly typical isthmus dependent on 

presentation with rapid ventricular rate. s/p MARY JO with CV 6/1


2. Coronary artery disease:


   a. Three-vessel disease, status post repeated bypass surgery, most recently 

in 2002. 


3. CHF with cardiomyopathy, likely ischemic:


    a. Reduced LVEF at 40% to 45% on echo 03/09/2020.


4. Peripheral vascular disease:


    a. Status post aortic abdominal aneurysm endovascular repair on 02/05/2018.


5. Hypertension and hyperlipidemia.


6. History of obstructive sleep apnea, on CPAP.


7. Elevated BMI greater than 30.


8. BPH and degenerative joint disease.








 We once again discussed potential treatment options for his atrial arrhythmias 

including watchful waiting, antiarrhythmic therapy or EP study with ablation.  

At this point he favors EP study with ablation. I will keep him NPO after 

midnight, hold his Lovenox implant for electrophysiology study with possible 

ablation in the right and/or left atrium.  Risks include hematoma and bleeding 

at the groin sites,  recurrent arrhythmias,  stroke, pericardial effusion, need 

for CV surgery, atrial esophageal fistula, or death.   Long-term 

anticoagulation requirements will be addressed pending the findings of the EP 

study and ablation.

## 2020-06-03 NOTE — PDOC.CPN
- Subjective


Date: 06/03/20


Time: 11:26


Interval history: 





The pt seen and examined.  No overnight events.  No cardiac complaints. 





- Objective


Allergies/Adverse Reactions: 


 Allergies











Allergy/AdvReac Type Severity Reaction Status Date / Time


 


hydrochlorothiazide Allergy   Verified 03/09/20 05:03











Visit Medications: 


 Current Medications





Acetaminophen (Tylenol)  650 mg PO Q4H PRN


   PRN Reason: Headache/Fever/Mild Pain (1-3)


   Last Admin: 06/02/20 20:25 Dose:  650 mg


Aspirin (Ecotrin)  81 mg PO DAILY [facility]


   Last Admin: 06/03/20 05:43 Dose:  81 mg


Atorvastatin Calcium (Lipitor)  40 mg PO QPM [facility]


   Last Admin: 06/02/20 20:25 Dose:  40 mg


Benzonatate (Tessalon)  100 mg PO TIDPRN PRN


   PRN Reason: Cough


   Last Admin: 06/02/20 09:31 Dose:  100 mg


Bisacodyl (Dulcolax)  10 mg PO DAILYPRN PRN


   PRN Reason: Constipation


   Last Admin: 06/02/20 20:25 Dose:  10 mg


Carvedilol (Coreg)  3.125 mg PO BID [facility]


   Last Admin: 06/03/20 05:43 Dose:  3.125 mg


Enoxaparin Sodium (Lovenox)  110 mg SC 0900,2100 [facility]


   Last Admin: 06/02/20 09:27 Dose:  110 mg


Fluticasone Propionate (Flonase Nasal Spray)  1 gm NASAL DAILY [facility]


Morphine Sulfate (Morphine)  4 mg SLOW IVP Q4H PRN


   PRN Reason: Chest Pain


   Last Admin: 06/02/20 09:33 Dose:  4 mg


Nitroglycerin (Nitrostat)  0.4 mg PO Q5MIN PRN


   PRN Reason: Chest Pain


   Last Admin: 06/02/20 09:35 Dose:  0.4 mg


Pantoprazole Sodium (Protonix)  40 mg IVP DAILY [facility]


   Last Admin: 06/02/20 09:29 Dose:  40 mg


Senna/Docusate Sodium (Senokot S)  2 tab PO BIDPRN PRN


   PRN Reason: Constipation


Sodium Chloride (Flush - Normal Saline)  10 ml IVF Q12HR PRN


   PRN Reason: Saline Flush


Sodium Chloride (Normal Saline Pf)  10 ml FS PRN PRN


   PRN Reason: RECONSTITUTION


   Last Admin: 06/02/20 09:30 Dose:  10 ml








Vital Signs & Weight: 


 Vital Signs











  Temp Pulse Resp BP Pulse Ox


 


 06/03/20 07:15  98.3 F  80  18  149/83 H  96


 


 06/03/20 03:38  98.3 F  75  18  136/80  97


 


 06/02/20 23:28  98.3 F    








 











Weight                         245 lb 11.2 oz

















- Physical Exam


General: alert & oriented x3


HEENT: mucus membranes moist


Neck: supple neck


Cardiac: regular rate and rhythm, S1/S2


Lungs: decreased breath sounds


Neuro: cranial nerve 2-12 intact





- Labs


Result Diagrams: 


 06/03/20 03:46





 06/03/20 03:46


 Troponin/CKMB











CK-MB (CK-2)  8.3 ng/mL (0-6.6)  H*  05/31/20  02:03    


 


Troponin I  0.866 ng/mL (< 0.028)  H*  05/31/20  08:42    














- Telemetry


Sinus rhythms and dysrhythmias: sinus rhythm





- Assessment/Plan


Assessment/Plan: 





1. Recurrent Aflutter with RVR (s/p CTI in 03/2018) - s/p MARY JO/DCCV on 06/01/2020

; remains in SR with Coreg 3.125mg BID; Lovenox BID; Plan for redo AFlutter RFA 

by Dr Brumfield today


2. CAD with hx of CABG and s/p LHC in 03/2020 with patent graft with EF 45-50%, 

hypokinetic inferior wall and apex - No chest pain or SOB today


3. Chronic Systolic HF with EF 40-45% in 03/2020 - stable; on Coreg 3.125mg BID

; may resume Entresto once his BP is more stable


4. Ischemic CMY 


5. s/p AAA repair in 02/2018


6. HTN - stable


7. HLD - statin


8. Sleep apnea with Cpap


MAR reviewed





* Echo in 03/2020 with EF 40-45%, grade I dd, inferior wall hypokinesis, mod DEANDRA

, mild LAE, mild TR and NC

## 2020-06-04 NOTE — PDOC.CPN
- Subjective


Date: 06/04/20


Time: 17:00


Interval history: 





The pt seen and examined.  No overnight events.  No cardiac complaints.  He 

stated he can breath much better after receiving Lasix IV 40mg





- Objective


Allergies/Adverse Reactions: 


 Allergies











Allergy/AdvReac Type Severity Reaction Status Date / Time


 


hydrochlorothiazide Allergy   Verified 03/09/20 05:03











Visit Medications: 


 Current Medications





Acetaminophen (Tylenol)  650 mg PO Q4H PRN


   PRN Reason: Headache/Fever/Mild Pain (1-3)


   Last Admin: 06/04/20 13:36 Dose:  650 mg


Amoxicillin/Clavulanate Potassium (Augmentin)  875 mg PO Q12HR [facility]


   Last Admin: 06/04/20 08:22 Dose:  875 mg


Aspirin (Ecotrin)  81 mg PO DAILY [facility]


   Last Admin: 06/04/20 08:21 Dose:  81 mg


Atorvastatin Calcium (Lipitor)  40 mg PO QPM [facility]


   Last Admin: 06/03/20 20:50 Dose:  40 mg


Benzonatate (Tessalon)  100 mg PO TIDPRN PRN


   PRN Reason: Cough


   Last Admin: 06/02/20 09:31 Dose:  100 mg


Bisacodyl (Dulcolax)  10 mg PO DAILYPRN PRN


   PRN Reason: Constipation


   Last Admin: 06/02/20 20:25 Dose:  10 mg


Fluticasone Propionate (Flonase Nasal Spray)  0 gm NASAL DAILY [facility]


   Last Admin: 06/04/20 08:21 Dose:  2 spr


Furosemide (Lasix)  40 mg PO DAILY PRN


   PRN Reason: Edema


Ketorolac Tromethamine (Toradol)  30 mg IVP Q6H PRN


   PRN Reason: Pain


   Stop: 06/08/20 17:00


Morphine Sulfate (Morphine)  4 mg SLOW IVP Q4H PRN


   PRN Reason: Chest Pain


   Last Admin: 06/02/20 09:33 Dose:  4 mg


Nitroglycerin (Nitrostat)  0.4 mg PO Q5MIN PRN


   PRN Reason: Chest Pain


   Last Admin: 06/02/20 09:35 Dose:  0.4 mg


Pantoprazole Sodium (Protonix)  40 mg PO DAILY [facility]


   Stop: 07/03/20 09:01


   Last Admin: 06/04/20 08:22 Dose:  40 mg


Rivaroxaban (Xarelto)  20 mg PO HS [facility]


   Last Admin: 06/03/20 20:51 Dose:  20 mg


Senna/Docusate Sodium (Senokot S)  2 tab PO BIDPRN PRN


   PRN Reason: Constipation


Sodium Chloride (Flush - Normal Saline)  10 ml IVF Q12HR PRN


   PRN Reason: Saline Flush


Sodium Chloride (Flush - Normal Saline)  10 ml IVF PRN PRN


   PRN Reason: Saline Flush


Sucralfate (Carafate)  1 gm PO ACHS MICHAEL


   Stop: 06/17/20 11:31


   Last Admin: 06/04/20 12:52 Dose:  1 gm








Vital Signs & Weight: 


 Vital Signs











  Temp Pulse Pulse Pulse Resp BP BP


 


 06/04/20 16:10  97.8 F  72    18  


 


 06/04/20 12:47  98.4 F  87    20  


 


 06/04/20 10:09    87  96   135/71  146/81 H


 


 06/04/20 07:28  98.8 F  87    22 H  


 


 06/04/20 06:57       














  BP Pulse Ox Pulse Ox Pulse Ox Pulse Ox


 


 06/04/20 16:10  138/81  95   


 


 06/04/20 12:47  134/66  95   


 


 06/04/20 10:09    91 L  95  93 L


 


 06/04/20 07:28  139/81  96   


 


 06/04/20 06:57   93 L   








 











Weight                         247 lb 4.8 oz

















- Physical Exam


General: alert & oriented x3


HEENT: mucus membranes moist


Neck: supple neck


Cardiac: regular rate and rhythm, S1/S2


Lungs: clear to auscultation, decreased breath sounds


Neuro: cranial nerve 2-12 intact





- Labs


Result Diagrams: 


 06/04/20 08:02





 06/04/20 08:02


 Troponin/CKMB











CK-MB (CK-2)  8.3 ng/mL (0-6.6)  H*  05/31/20  02:03    


 


Troponin I  0.866 ng/mL (< 0.028)  H*  05/31/20  08:42    














- Telemetry


Sinus rhythms and dysrhythmias: other (SR with PVCs)





- Assessment/Plan


Assessment/Plan: 





1. Recurrent Aflutter with RVR (s/p CTI in 03/2018) - s/p MARY JO/DCCV on 06/01/ 2020 and redo Aflutter RFA on 06/03/2020; remains in SR with Coreg 3.125mg BID, 

which will be increased to 6.25mg BID; On Xarelto 20mg qd; H&H tomorrow AM for 

hx of tarry stool x 1 today


2. CAD with hx of CABG and s/p LHC in 03/2020 with patent graft with EF 45-50%, 

hypokinetic inferior wall and apex - No chest pain or SOB today


3. Chronic Systolic HF with EF 40-45% in 03/2020 - stable; on Lasix and Coreg 

3.125mg BID, which will be increased to 6.25mg BID from this PM; may resume 

Entresto once his BP is more stable


4. Ischemic CMY 


5. s/p AAA repair in 02/2018


6. HTN - will increase Coreg to 6.25mg BID from this PM


7. HLD - statin


8. Sleep apnea with Cpap


MAR reviewed





* Echo in 03/2020 with EF 40-45%, grade I dd, inferior wall hypokinesis, mod DEANDRA

, mild LAE, mild TR and MO





Pt. seen and eval. by me. I agree with the A/P by the NP. He is still a little 

SOB this afternoon but much better this AM. He feels better since the ablation. 

Chest: clear. RRR with occ. ectopy. If stable in AM then probably home 

tomorrow.. jimmie

## 2020-06-04 NOTE — PDOC.HOSPP
- Subjective


Encounter Date: 06/03/20


Encounter Time: 11:30


Subjective: 





pt up in bed no complains. He is going for ablation today





- Objective


Vital Signs & Weight: 


 Vital Signs (12 hours)











  Temp Pulse Pulse Pulse Resp BP BP


 


 06/04/20 12:47  98.4 F  87    20  


 


 06/04/20 10:09    87  96   135/71  146/81 H


 


 06/04/20 07:28  98.8 F  87    22 H  


 


 06/04/20 06:57       


 


 06/04/20 03:47  98.5 F  85    18  














  BP Pulse Ox Pulse Ox Pulse Ox Pulse Ox


 


 06/04/20 12:47  134/66  95   


 


 06/04/20 10:09    91 L  95  93 L


 


 06/04/20 07:28  139/81  96   


 


 06/04/20 06:57   93 L   


 


 06/04/20 03:47  158/93 H  93 L   








 Weight











Weight                         247 lb 4.8 oz














I&O: 


 











 06/03/20 06/04/20 06/05/20





 06:59 06:59 06:59


 


Intake Total 240 4700 


 


Output Total 800  


 


Balance -560 4700 











Result Diagrams: 


 06/04/20 08:02





 06/04/20 08:02





Hospitalist ROS





- Review of Systems


Respiratory: denies: cough, dry, shortness of breath, hemoptysis, SOB with 

excertion, pleuritic pain, sputum, wheezing, other


Cardiovascular: denies: chest pain, palpitations, orthopnea, paroxysmal noc. 

dyspnea, edema, light headedness, other


Gastrointestinal: denies: nausea, vomiting, abdominal pain, diarrhea, 

constipation, melena, hematochezia, other





- Medication


Medications: 


Active Medications











Generic Name Dose Route Start Last Admin





  Trade Name Freq  PRN Reason Stop Dose Admin


 


Acetaminophen  650 mg  05/31/20 03:11  06/04/20 13:36





  Tylenol  PO   650 mg





  Q4H PRN   Administration





  Headache/Fever/Mild Pain (1-3)   





     





     





     


 


Amoxicillin/Clavulanate Potassium  875 mg  06/03/20 21:00  06/04/20 08:22





  Augmentin  PO   875 mg





  Q12HR MICHAEL   Administration





     





     





     





     


 


Aspirin  81 mg  05/31/20 09:00  06/04/20 08:21





  Ecotrin  PO   81 mg





  DAILY MICHAEL   Administration





     





     





     





     


 


Atorvastatin Calcium  40 mg  05/31/20 21:00  06/03/20 20:50





  Lipitor  PO   40 mg





  QPM MICHAEL   Administration





     





     





     





     


 


Benzonatate  100 mg  06/02/20 09:11  06/02/20 09:31





  Tessalon  PO   100 mg





  TIDPRN PRN   Administration





  Cough   





     





     





     


 


Bisacodyl  10 mg  05/31/20 03:11  06/02/20 20:25





  Dulcolax  PO   10 mg





  DAILYPRN PRN   Administration





  Constipation   





     





     





     


 


Carvedilol  3.125 mg  06/02/20 21:00  06/04/20 08:22





  Coreg  PO   3.125 mg





  BID MICHAEL   Administration





     





     





     





     


 


Fluticasone Propionate  0 gm  06/04/20 09:00  06/04/20 08:21





  Flonase Nasal [place]  NASAL   2 spr





  DAILY MICHAEL   Administration





     





     





     





     


 


Morphine Sulfate  4 mg  05/31/20 08:43  06/02/20 09:33





  Morphine  SLOW IVP   4 mg





  Q4H PRN   Administration





  Chest Pain   





     





     





     


 


Nitroglycerin  0.4 mg  05/31/20 03:06  06/02/20 09:35





  Nitrostat  PO   0.4 mg





  Q5MIN PRN   Administration





  Chest Pain   





     





     





     


 


Pantoprazole Sodium  40 mg  06/04/20 09:00  06/04/20 08:22





  Protonix  PO  07/03/20 09:01  40 mg





  DAILY MICHAEL   Administration





     





     





     





     


 


Rivaroxaban  20 mg  06/03/20 21:00  06/03/20 20:51





  Xarelto  PO   20 mg





  HS MICHAEL   Administration





     





     





     





     


 


Sucralfate  1 gm  06/03/20 17:00  06/04/20 12:52





  Carafate  PO  06/17/20 11:31  1 gm





  ACHS MICHAEL   Administration





     





     





     





     














- Exam


Neck: negative: supple, symmetric, no JVD, no thyromegaly, no lymphadenopathy, 

no carotid bruit, JVD


Heart: negative: RRR, no murmur, no gallops, no rubs, normal peripheral pulses, 

irregular, diminshed peripheral pulses, murmur present, II/IV, III/IV


Respiratory: negative: CTAB, no wheezes, no rales, no ronchi, normal chest 

expansion, no tachypnea, normal percussion, rales, rhonchi, tachypneic, wheezes


Gastrointestinal: negative: soft, non-tender, non-distended, normal bowel sounds

, no palpable masses, no hepatomegaly, no splenomegaly, no bruit, no guarding, 

no rigidity, tender to palpation, distended, diminished bowl sounds, voluntary 

guarding





Hosp A/P


(1) Chest pain


Code(s): R07.9 - CHEST PAIN, UNSPECIFIED   Status: Acute   





(2) Atrial flutter


Code(s): I48.92 - UNSPECIFIED ATRIAL FLUTTER   Status: Acute   





(3) Elevated troponin


Code(s): R74.8 - ABNORMAL LEVELS OF OTHER SERUM ENZYMES   Status: Acute   





(4) HTN (hypertension)


Code(s): I10 - ESSENTIAL (PRIMARY) HYPERTENSION   Status: Acute   





(5) CLAY (obstructive sleep apnea)


Code(s): G47.33 - OBSTRUCTIVE SLEEP APNEA (ADULT) (PEDIATRIC)   Status: Acute   





- Plan





 He is currently rate controlled. will continue AC. pt to undergo DCCV





6/2 pt up in bed complains of sob will get cxr and pt will need lasix. 





6/3 pt going for ablation today. will continue current meds.

## 2020-06-04 NOTE — PDOC.HOSPP
- Subjective


Encounter Date: 06/02/20


Encounter Time: 10:00


Subjective: 





pt up in bed complains of sob





- Objective


Vital Signs & Weight: 


 Vital Signs (12 hours)











  Temp Pulse Pulse Pulse Resp BP BP


 


 06/04/20 12:47  98.4 F  87    20  


 


 06/04/20 10:09    87  96   135/71  146/81 H


 


 06/04/20 07:28  98.8 F  87    22 H  


 


 06/04/20 06:57       


 


 06/04/20 03:47  98.5 F  85    18  














  BP Pulse Ox Pulse Ox Pulse Ox Pulse Ox


 


 06/04/20 12:47  134/66  95   


 


 06/04/20 10:09    91 L  95  93 L


 


 06/04/20 07:28  139/81  96   


 


 06/04/20 06:57   93 L   


 


 06/04/20 03:47  158/93 H  93 L   








 Weight











Weight                         247 lb 4.8 oz














I&O: 


 











 06/03/20 06/04/20 06/05/20





 06:59 06:59 06:59


 


Intake Total 240 4700 


 


Output Total 800  


 


Balance -560 4700 











Result Diagrams: 


 06/04/20 08:02





 06/04/20 08:02





Hospitalist ROS





- Review of Systems


Respiratory: reports: shortness of breath


Cardiovascular: denies: chest pain, palpitations, orthopnea, paroxysmal noc. 

dyspnea, edema, light headedness, other


Gastrointestinal: denies: nausea, vomiting, abdominal pain, diarrhea, 

constipation, melena, hematochezia, other





- Medication


Medications: 


Active Medications











Generic Name Dose Route Start Last Admin





  Trade Name Freq  PRN Reason Stop Dose Admin


 


Acetaminophen  650 mg  05/31/20 03:11  06/04/20 13:36





  Tylenol  PO   650 mg





  Q4H PRN   Administration





  Headache/Fever/Mild Pain (1-3)   





     





     





     


 


Amoxicillin/Clavulanate Potassium  875 mg  06/03/20 21:00  06/04/20 08:22





  Augmentin  PO   875 mg





  Q12HR MICHAEL   Administration





     





     





     





     


 


Aspirin  81 mg  05/31/20 09:00  06/04/20 08:21





  Ecotrin  PO   81 mg





  DAILY MICHAEL   Administration





     





     





     





     


 


Atorvastatin Calcium  40 mg  05/31/20 21:00  06/03/20 20:50





  Lipitor  PO   40 mg





  QPM MICHAEL   Administration





     





     





     





     


 


Benzonatate  100 mg  06/02/20 09:11  06/02/20 09:31





  Tessalon  PO   100 mg





  TIDPRN PRN   Administration





  Cough   





     





     





     


 


Bisacodyl  10 mg  05/31/20 03:11  06/02/20 20:25





  Dulcolax  PO   10 mg





  DAILYPRN PRN   Administration





  Constipation   





     





     





     


 


Carvedilol  3.125 mg  06/02/20 21:00  06/04/20 08:22





  Coreg  PO   3.125 mg





  BID MICHAEL   Administration





     





     





     





     


 


Fluticasone Propionate  0 gm  06/04/20 09:00  06/04/20 08:21





  Flonase Nasal [place]  NASAL   2 spr





  DAILY MICHAEL   Administration





     





     





     





     


 


Morphine Sulfate  4 mg  05/31/20 08:43  06/02/20 09:33





  Morphine  SLOW IVP   4 mg





  Q4H PRN   Administration





  Chest Pain   





     





     





     


 


Nitroglycerin  0.4 mg  05/31/20 03:06  06/02/20 09:35





  Nitrostat  PO   0.4 mg





  Q5MIN PRN   Administration





  Chest Pain   





     





     





     


 


Pantoprazole Sodium  40 mg  06/04/20 09:00  06/04/20 08:22





  Protonix  PO  07/03/20 09:01  40 mg





  DAILY MICHAEL   Administration





     





     





     





     


 


Rivaroxaban  20 mg  06/03/20 21:00  06/03/20 20:51





  Xarelto  PO   20 mg





  HS MICHAEL   Administration





     





     





     





     


 


Sucralfate  1 gm  06/03/20 17:00  06/04/20 12:52





  Carafate  PO  06/17/20 11:31  1 gm





  ACHS MICHAEL   Administration





     





     





     





     














- Exam


Neck: negative: supple, symmetric, no JVD, no thyromegaly, no lymphadenopathy, 

no carotid bruit, JVD


Heart: negative: RRR, no murmur, no gallops, no rubs, normal peripheral pulses, 

irregular, diminshed peripheral pulses, murmur present, II/IV, III/IV


Respiratory: rales


Gastrointestinal: negative: soft, non-tender, non-distended, normal bowel sounds

, no palpable masses, no hepatomegaly, no splenomegaly, no bruit, no guarding, 

no rigidity, tender to palpation, distended, diminished bowl sounds, voluntary 

guarding





Hosp A/P


(1) Chest pain


Code(s): R07.9 - CHEST PAIN, UNSPECIFIED   Status: Acute   





(2) Atrial flutter


Code(s): I48.92 - UNSPECIFIED ATRIAL FLUTTER   Status: Acute   





(3) Elevated troponin


Code(s): R74.8 - ABNORMAL LEVELS OF OTHER SERUM ENZYMES   Status: Acute   





(4) HTN (hypertension)


Code(s): I10 - ESSENTIAL (PRIMARY) HYPERTENSION   Status: Acute   





(5) CLAY (obstructive sleep apnea)


Code(s): G47.33 - OBSTRUCTIVE SLEEP APNEA (ADULT) (PEDIATRIC)   Status: Acute   





- Plan





 He is currently rate controlled. will continue AC. pt to undergo DCCV





6/2 pt up in bed complains of sob will get cxr and pt will need lasix.

## 2020-06-04 NOTE — PDOC.EP
- Subjective


Date: 06/04/20


Time: 08:00


Interval History: 





EP follow-up after electrophysiology study and ablation on 06/03/2020.  Patient 

is experiencing shortness of breath and fluid overload this morning.  He was 

given 40 mg of IV Lasix which  has slightly reduced his shortness of breath.  

He reports he is breathing easier but still experiencing shortness of breath.   

He is not experiencing chest pain palpitations, heart racing, stroke or stroke-

like symptoms.  He was started on anticoagulation last night.  This morning he 

had  a dark tarry stool but no carmen blood was seen





- Review of Systems


Constitutional: denies: chills, fever, sweats, weakness


Respiratory: reports: shortness of breath.  denies: cough, sputum, wheezing


Cardiology: denies: chest pain, heart racing, light headedness, palpitations


Gastrointestinal: reports: melena.  denies: abdominal pain, constipation, 

diarrhea, hematochezia, nausea, vomitting


Musculoskeletal: denies: unstable gait, falls, neck pain





- Objective


Allergies/Adverse Reactions: 


 Allergies











Allergy/AdvReac Type Severity Reaction Status Date / Time


 


hydrochlorothiazide Allergy   Verified 03/09/20 05:03











 Current Medications





Acetaminophen (Tylenol)  650 mg PO Q4H PRN


   PRN Reason: Headache/Fever/Mild Pain (1-3)


   Last Admin: 06/04/20 13:36 Dose:  650 mg


Amoxicillin/Clavulanate Potassium (Augmentin)  875 mg PO Q12HR [facility]


   Last Admin: 06/04/20 08:22 Dose:  875 mg


Aspirin (Ecotrin)  81 mg PO DAILY [facility]


   Last Admin: 06/04/20 08:21 Dose:  81 mg


Atorvastatin Calcium (Lipitor)  40 mg PO QPM [facility]


   Last Admin: 06/03/20 20:50 Dose:  40 mg


Benzonatate (Tessalon)  100 mg PO TIDPRN PRN


   PRN Reason: Cough


   Last Admin: 06/02/20 09:31 Dose:  100 mg


Bisacodyl (Dulcolax)  10 mg PO DAILYPRN PRN


   PRN Reason: Constipation


   Last Admin: 06/02/20 20:25 Dose:  10 mg


Carvedilol (Coreg)  3.125 mg PO BID [facility]


   Last Admin: 06/04/20 08:22 Dose:  3.125 mg


Fluticasone Propionate (Flonase Nasal Spray)  0 gm NASAL DAILY [facility]


   Last Admin: 06/04/20 08:21 Dose:  2 spr


Furosemide (Lasix)  40 mg PO DAILY PRN


   PRN Reason: Edema


Ketorolac Tromethamine (Toradol)  30 mg IVP Q6H PRN


   PRN Reason: Pain


   Stop: 06/08/20 17:00


Morphine Sulfate (Morphine)  4 mg SLOW IVP Q4H PRN


   PRN Reason: Chest Pain


   Last Admin: 06/02/20 09:33 Dose:  4 mg


Nitroglycerin (Nitrostat)  0.4 mg PO Q5MIN PRN


   PRN Reason: Chest Pain


   Last Admin: 06/02/20 09:35 Dose:  0.4 mg


Pantoprazole Sodium (Protonix)  40 mg PO DAILY [facility]


   Stop: 07/03/20 09:01


   Last Admin: 06/04/20 08:22 Dose:  40 mg


Rivaroxaban (Xarelto)  20 mg PO HS [facility]


   Last Admin: 06/03/20 20:51 Dose:  20 mg


Senna/Docusate Sodium (Senokot S)  2 tab PO BIDPRN PRN


   PRN Reason: Constipation


Sodium Chloride (Flush - Normal Saline)  10 ml IVF Q12HR PRN


   PRN Reason: Saline Flush


Sodium Chloride (Flush - Normal Saline)  10 ml IVF PRN PRN


   PRN Reason: Saline Flush


Sucralfate (Carafate)  1 gm PO ACHS [facility]


   Stop: 06/17/20 11:31


   Last Admin: 06/04/20 12:52 Dose:  1 gm








Vital Signs & Weight: 


 Vital Signs











  Temp Pulse Pulse Pulse Resp BP BP


 


 06/04/20 12:47  98.4 F  87    20  


 


 06/04/20 10:09    87  96   135/71  146/81 H


 


 06/04/20 07:28  98.8 F  87    22 H  


 


 06/04/20 06:57       














  BP Pulse Ox Pulse Ox Pulse Ox Pulse Ox


 


 06/04/20 12:47  134/66  95   


 


 06/04/20 10:09    91 L  95  93 L


 


 06/04/20 07:28  139/81  96   


 


 06/04/20 06:57   93 L   








 











Weight                         247 lb 4.8 oz














I/O: 


 I/O











 06/03/20 06/04/20 06/05/20





 06:59 06:59 06:59


 


Intake Total 240 4700 


 


Output Total 800  


 


Balance -560 4700 














- Quality Measures


Condition: Atrial Fibrillation/Flutter (hx or current) (lovenox)


CV meds: Xarelto: Yes





- Physical Exam


General: alert & oriented x3, appears well, speech clear, affect appropriate


HEENT: mucus membranes moist, normocephaly


Neck: supple neck, no lymphadenopathy, JVD/HJR


Cardiology: regular rate and rhythm, no murmur, PMI nondisplaced


Lungs: no wheezes, no rhonchi, bibasilar rales


Neurology: cranial nerve 2-12 intact, grossly intact, no lateralizing findings


Abdomen: active bowel sounds, no pulsations/bruits.  negative: HJR negative


Extremities: dry, strong pulses, warm


Skin: groin sites stable





- Chadsvasc Risk factors


Age >75: 2


Risk Score: 2





- Labs


Result Diagrams: 


 06/04/20 08:02





 06/04/20 08:02





- Assessment/Plan


Assessment/Plan: 








1. Recurrent atrial arrhythmias:


    a. History of atrial flutter.


    b. Status post CTI ablation on 03/09/2018.


    c. Recurrent atrial flutter, possibly typical isthmus dependent on 

presentation with rapid ventricular rate. s/p MARY JO with CV 6/1


2. Coronary artery disease:


   a. Three-vessel disease, status post repeated bypass surgery, most recently 

in 2002. 


3. CHF with cardiomyopathy, likely ischemic:


    a. Reduced LVEF at 40% to 45% on echo 03/09/2020.


4. Peripheral vascular disease:


    a. Status post aortic abdominal aneurysm endovascular repair on 02/05/2018.


5. Hypertension and hyperlipidemia.


6. History of obstructive sleep apnea, on CPAP.


7. Elevated BMI greater than 30.


8. BPH and degenerative joint disease








Maintaining sinus rhythm overnight and on EKG this morning.  He was previously 

on Lasix at home.    He is now receiving diuretics to address his  moderate 

fluid overload.  have ordered for potassium replacement this morning and he may 

take an additional Lasix 40 mg this afternoon if he continues to have shortness 

of breath.   I started Xarelto 20 mg q.p.m. last night and he reports a dark 

tarry stool this morning. Hemoglobin and hematocrit are stable today. Recheck H/

H  in a.m. and screen for occult blood/guaiac.  Will reassess in the morning.  

He is not ready for discharge at this time.





Inducible for atrial fibrillation during EP study and received 34 minutes RF 

energy lesions delivered.  He was not inducible for typical atrial flutter and 

prior CTI flutter ablation line was still established.

## 2020-06-04 NOTE — OP
DATE OF PROCEDURE:  06/03/2020



PROCEDURE PERFORMED:  Electrophysiology study and radiofrequency ablation report.



REASON FOR PROCEDURE:  Mr. Mantilla is a 79-year-old man with prior history of

coronary artery disease, bypass grafting surgery, prior history of atrial flutter,

status post CTI ablation, has had recurrent atypical atrial flutter and here for

ablation. 



DESCRIPTION OF PROCEDURE:  The patient received general anesthesia by Anesthesia

specialist.  The left and right femoral vein was prepped, draped and accessed using

a multipurpose needle under ultrasound guidance. On the left side, an 11-Grenadian

sheath was introduced through which an intracardiac echocardiogram probe was used to

monitor the catheter manipulation, pericardial space and the transseptal procedure. 



Also from the left femoral vein, a Preface sheath was used to advance a 20-pole

duo-Deca catheter into the CS and the right atrial position.  On the right side,

initially two 8-Grenadian short sheaths were introduced through which a ThermoCool SFST

catheter was advanced to the right atrium to obtain a 3D map of the right atrium. CS

His bundle was delineated.  Also activation map of the right atrium was obtained

during proximal CS pacing and patency of the previously placed cavotricuspid isthmus

ablation line was verified.  The transisthmus time was up to 250 milliseconds and

longest transisthmus time was seen adjacent to the prior ablation line.  Following

that, burst atrial pacing induced atrial fibrillation.  The two right-sided femoral

venous sheaths were exchanged to two SL1 sheaths, which were used to perform a

transseptal puncture x2 with help of a powered [place] needle under intracardiac echo

monitoring.  Prior to that, IV heparin was initiated in a bolus and a drip fashion,

which were periodically adjusted according to the ACT findings to keep ACT over 350

throughout the procedure.  Through the SL1 sheath, a ThermoCool SFST and a 20-pole

Lasso catheter was advanced to the left atrium.  3D map of the left atrium was

obtained.  Following that, pulmonary venous isolation was performed successfully.  A

very small area in the left superior pulmonary vein was difficult to eliminate all

signals adjacent to the left atrial appendage.  A roof line and an inferior line

were also placed to obtain posterior wall isolation and some residual posterior wall

conduction was still seen mostly in esophageal areas and the roof and it was

difficult to get reliable signal.  Throughout the posterior wall manipulation and

burns, esophageal temperature probe was adjusted and monitored for overheating. 



Following that, Isuprel was initiated at 10 mcg per minute over 10 minutes.  Any

reconnections of the pulmonary veins were re-ablated.  Also the ablation catheter

was advanced to the left ventricle, and left ventricular pacing was performed and no

VA conduction was seen ruling out accessory pathway. 



Following that, the catheters were removed from the left side.  IV heparin was

stopped and later reversed with protamine.  The cardiac silhouette did not change

throughout the procedure and the intracardiac echo probe also revealed no change in

the pericardial fat pad. 



Following that, the long sheaths were exchanged for short sheaths and Vascade

closure device was used to obtain hemostasis on all 4 femoral venous access sites. 



Total of 110 ablation lesion placed at 34 minutes and 16 seconds.



CONCLUSION:  

1. Inducible atrial fibrillation, but no typical isthmus dependent flutter is

inducible. 

2. The prior cavotricuspid isthmus ablation line appears to be patent with

unidirectional block. 

3. Pulmonary venous isolation was performed in all 4 pulmonary veins and partial

isolation of posterior wall also obtained. 

4. No evidence of accessory pathway.

5. No VA conduction.

6. Normal HV interval at 46 milliseconds and antegrade Wenckebach cycle length was

360 milliseconds noted. 



PLAN:  Resume oral anticoagulation.  Monitor for recurrent arrhythmias.







Job ID:  [id number]

## 2020-06-04 NOTE — PDOC.HOSPP
- Subjective


Encounter Date: 06/04/20


Encounter Time: 11:15


Subjective: 





pt up in bed feels sob





- Objective


Vital Signs & Weight: 


 Vital Signs (12 hours)











  Temp Pulse Pulse Pulse Resp BP BP


 


 06/04/20 12:47  98.4 F  87    20  


 


 06/04/20 10:09    87  96   135/71  146/81 H


 


 06/04/20 07:28  98.8 F  87    22 H  


 


 06/04/20 06:57       


 


 06/04/20 03:47  98.5 F  85    18  














  BP Pulse Ox Pulse Ox Pulse Ox Pulse Ox


 


 06/04/20 12:47  134/66  95   


 


 06/04/20 10:09    91 L  95  93 L


 


 06/04/20 07:28  139/81  96   


 


 06/04/20 06:57   93 L   


 


 06/04/20 03:47  158/93 H  93 L   








 Weight











Weight                         247 lb 4.8 oz














I&O: 


 











 06/03/20 06/04/20 06/05/20





 06:59 06:59 06:59


 


Intake Total 240 4700 


 


Output Total 800  


 


Balance -560 4700 











Result Diagrams: 


 06/04/20 08:02





 06/04/20 08:02





Hospitalist ROS





- Review of Systems


Respiratory: reports: shortness of breath


Cardiovascular: denies: chest pain, palpitations, orthopnea, paroxysmal noc. 

dyspnea, edema, light headedness, other


Gastrointestinal: denies: nausea, vomiting, abdominal pain, diarrhea, 

constipation, melena, hematochezia, other


Genitourinary: denies: dysuria, frequency, incontinence, hematuria, retention, 

other





- Medication


Medications: 


Active Medications











Generic Name Dose Route Start Last Admin





  Trade Name Kaylan  PRN Reason Stop Dose Admin


 


Acetaminophen  650 mg  05/31/20 03:11  06/04/20 13:36





  Tylenol  PO   650 mg





  Q4H PRN   Administration





  Headache/Fever/Mild Pain (1-3)   





     





     





     


 


Amoxicillin/Clavulanate Potassium  875 mg  06/03/20 21:00  06/04/20 08:22





  Augmentin  PO   875 mg





  Q12HR MICHAEL   Administration





     





     





     





     


 


Aspirin  81 mg  05/31/20 09:00  06/04/20 08:21





  Ecotrin  PO   81 mg





  DAILY MICHAEL   Administration





     





     





     





     


 


Atorvastatin Calcium  40 mg  05/31/20 21:00  06/03/20 20:50





  Lipitor  PO   40 mg





  QPM MICHAEL   Administration





     





     





     





     


 


Benzonatate  100 mg  06/02/20 09:11  06/02/20 09:31





  Tessalon  PO   100 mg





  TIDPRN PRN   Administration





  Cough   





     





     





     


 


Bisacodyl  10 mg  05/31/20 03:11  06/02/20 20:25





  Dulcolax  PO   10 mg





  DAILYPRN PRN   Administration





  Constipation   





     





     





     


 


Carvedilol  3.125 mg  06/02/20 21:00  06/04/20 08:22





  Coreg  PO   3.125 mg





  BID MICHAEL   Administration





     





     





     





     


 


Fluticasone Propionate  0 gm  06/04/20 09:00  06/04/20 08:21





  Flonase Nasal [place]  NASAL   2 spr





  DAILY MICHAEL   Administration





     





     





     





     


 


Morphine Sulfate  4 mg  05/31/20 08:43  06/02/20 09:33





  Morphine  SLOW IVP   4 mg





  Q4H PRN   Administration





  Chest Pain   





     





     





     


 


Nitroglycerin  0.4 mg  05/31/20 03:06  06/02/20 09:35





  Nitrostat  PO   0.4 mg





  Q5MIN PRN   Administration





  Chest Pain   





     





     





     


 


Pantoprazole Sodium  40 mg  06/04/20 09:00  06/04/20 08:22





  Protonix  PO  07/03/20 09:01  40 mg





  DAILY MICHAEL   Administration





     





     





     





     


 


Rivaroxaban  20 mg  06/03/20 21:00  06/03/20 20:51





  Xarelto  PO   20 mg





  HS MICHAEL   Administration





     





     





     





     


 


Sucralfate  1 gm  06/03/20 17:00  06/04/20 12:52





  Carafate  PO  06/17/20 11:31  1 gm





  ACHS MICHAEL   Administration





     





     





     





     














- Exam


Neck: negative: supple, symmetric, no JVD, no thyromegaly, no lymphadenopathy, 

no carotid bruit, JVD


Heart: negative: RRR, no murmur, no gallops, no rubs, normal peripheral pulses, 

irregular, diminshed peripheral pulses, murmur present, II/IV, III/IV


Respiratory: negative: CTAB, no wheezes, no rales, no ronchi, normal chest 

expansion, no tachypnea, normal percussion, rales, rhonchi, tachypneic, wheezes





Hosp A/P


(1) Chest pain


Code(s): R07.9 - CHEST PAIN, UNSPECIFIED   Status: Acute   





(2) Atrial flutter


Code(s): I48.92 - UNSPECIFIED ATRIAL FLUTTER   Status: Acute   





(3) Elevated troponin


Code(s): R74.8 - ABNORMAL LEVELS OF OTHER SERUM ENZYMES   Status: Acute   





(4) HTN (hypertension)


Code(s): I10 - ESSENTIAL (PRIMARY) HYPERTENSION   Status: Acute   





(5) CLAY (obstructive sleep apnea)


Code(s): G47.33 - OBSTRUCTIVE SLEEP APNEA (ADULT) (PEDIATRIC)   Status: Acute   





- Plan





 He is currently rate controlled. will continue AC. pt to undergo DCCV





6/2 pt up in bed complains of sob will get cxr and pt will need lasix. 





6/3 pt going for ablation today. will continue current meds. 





6/4 pt doing well, after lasix he was ambulated and did not drops his oxygen 

sat. will continue lasix daily

## 2020-06-05 NOTE — PDOC.HOSPP
- Subjective


Encounter Date: 06/05/20


Encounter Time: 09:45


Subjective: 





pt up in bed no complains





- Objective


Vital Signs & Weight: 


 Vital Signs (12 hours)











  Temp Pulse Pulse Pulse Resp BP BP


 


 06/05/20 12:00  98.6 F  72    18  


 


 06/05/20 09:06    80  78    177/79 H


 


 06/05/20 08:44       136/87 


 


 06/05/20 08:00  98.4 F  87    24 H  


 


 06/05/20 07:45       














  BP BP Pulse Ox Pulse Ox Pulse Ox


 


 06/05/20 12:00   152/87 H   


 


 06/05/20 09:06  137/79    96  96


 


 06/05/20 08:44     


 


 06/05/20 08:00   136/87  95  


 


 06/05/20 07:45    96  








 Weight











Weight                         242 lb 8 oz














I&O: 


 











 06/04/20 06/05/20 06/06/20





 06:59 06:59 06:59


 


Intake Total 4700 1200 


 


Output Total  2025 


 


Balance 4700 -825 











Result Diagrams: 


 06/05/20 09:54





 06/05/20 04:28





Hospitalist ROS





- Review of Systems


Cardiovascular: denies: chest pain, palpitations, orthopnea, paroxysmal noc. 

dyspnea, edema, light headedness, other


Gastrointestinal: denies: nausea, vomiting, abdominal pain, diarrhea, 

constipation, melena, hematochezia, other


Genitourinary: denies: dysuria, frequency, incontinence, hematuria, retention, 

other





- Medication


Medications: 


Active Medications











Generic Name Dose Route Start Last Admin





  Trade Name Freq  PRN Reason Stop Dose Admin


 


Acetaminophen  650 mg  05/31/20 03:11  06/04/20 13:36





  Tylenol  PO   650 mg





  Q4H PRN   Administration





  Headache/Fever/Mild Pain (1-3)   





     





     





     


 


Amoxicillin/Clavulanate Potassium  875 mg  06/03/20 21:00  06/05/20 08:44





  Augmentin  PO   875 mg





  Q12HR MICHAEL   Administration





     





     





     





     


 


Aspirin  81 mg  05/31/20 09:00  06/05/20 08:44





  Ecotrin  PO   81 mg





  DAILY MICHAEL   Administration





     





     





     





     


 


Atorvastatin Calcium  40 mg  05/31/20 21:00  06/04/20 20:41





  Lipitor  PO   40 mg





  QPM MICHAEL   Administration





     





     





     





     


 


Benzonatate  100 mg  06/02/20 09:11  06/02/20 09:31





  Tessalon  PO   100 mg





  TIDPRN PRN   Administration





  Cough   





     





     





     


 


Bisacodyl  10 mg  05/31/20 03:11  06/02/20 20:25





  Dulcolax  PO   10 mg





  DAILYPRN PRN   Administration





  Constipation   





     





     





     


 


Carvedilol  6.25 mg  06/04/20 21:00  06/05/20 08:44





  Coreg  PO   6.25 mg





  BID MICHAEL   Administration





     





     





     





     


 


Fluticasone Propionate  0 gm  06/04/20 09:00  06/05/20 08:45





  Flonase Nasal [place]  NASAL   1 spr





  DAILY MICHAEL   Administration





     





     





     





     


 


Furosemide  40 mg  06/05/20 07:30  06/05/20 08:45





  Lasix  PO   40 mg





  DAILY-AC MICHAEL   Administration





     





     





     





     


 


Morphine Sulfate  4 mg  05/31/20 08:43  06/02/20 09:33





  Morphine  SLOW IVP   4 mg





  Q4H PRN   Administration





  Chest Pain   





     





     





     


 


Nitroglycerin  0.4 mg  05/31/20 03:06  06/02/20 09:35





  Nitrostat  PO   0.4 mg





  Q5MIN PRN   Administration





  Chest Pain   





     





     





     


 


Pantoprazole Sodium  40 mg  06/04/20 09:00  06/05/20 08:44





  Protonix  PO  07/03/20 09:01  40 mg





  DAILY MICHAEL   Administration





     





     





     





     


 


Sodium Chloride  10 ml  05/31/20 03:11  06/05/20 08:45





  Flush - Normal Saline  IVF   10 ml





  Q12HR PRN   Administration





  Saline Flush   





     





     





     


 


Sodium Chloride  10 ml  06/03/20 19:00  06/05/20 14:43





  Flush - Normal Saline  IVF   10 ml





  PRN PRN   Administration





  Saline Flush   





     





     





     


 


Sucralfate  1 gm  06/03/20 17:00  06/05/20 12:44





  Carafate  PO  06/17/20 11:31  1 gm





  ACHS MICHAEL   Administration





     





     





     





     














- Exam


Neck: negative: supple, symmetric, no JVD, no thyromegaly, no lymphadenopathy, 

no carotid bruit, JVD


Heart: negative: RRR, no murmur, no gallops, no rubs, normal peripheral pulses, 

irregular, diminshed peripheral pulses, murmur present, II/IV, III/IV


Respiratory: negative: CTAB, no wheezes, no rales, no ronchi, normal chest 

expansion, no tachypnea, normal percussion, rales, rhonchi, tachypneic, wheezes


Gastrointestinal: negative: soft, non-tender, non-distended, normal bowel sounds

, no palpable masses, no hepatomegaly, no splenomegaly, no bruit, no guarding, 

no rigidity, tender to palpation, distended, diminished bowl sounds, voluntary 

guarding





Hosp A/P


(1) Chest pain


Code(s): R07.9 - CHEST PAIN, UNSPECIFIED   Status: Acute   





(2) Atrial flutter


Code(s): I48.92 - UNSPECIFIED ATRIAL FLUTTER   Status: Acute   





(3) Elevated troponin


Code(s): R74.8 - ABNORMAL LEVELS OF OTHER SERUM ENZYMES   Status: Acute   





(4) HTN (hypertension)


Code(s): I10 - ESSENTIAL (PRIMARY) HYPERTENSION   Status: Acute   





(5) CLAY (obstructive sleep apnea)


Code(s): G47.33 - OBSTRUCTIVE SLEEP APNEA (ADULT) (PEDIATRIC)   Status: Acute   





- Plan





 He is currently rate controlled. will continue AC. pt to undergo DCCV





6/2 pt up in bed complains of sob will get cxr and pt will need lasix. 





6/3 pt going for ablation today. will continue current meds. 





6/4 pt doing well, after lasix he was ambulated and did not drops his oxygen 

sat. will continue lasix daily





6/5 will replace K, pt  had several pvcs so cardio wants to keep him one more 

day. will check bmp in am.

## 2020-06-05 NOTE — PDOC.EP
- Subjective


Date: 06/05/20


Time: 10:52


Interval History: 





Breathing much easier today and feels ready to DC home. 





- Review of Systems


Respiratory: reports: shortness of breath.  denies: cough, sputum, wheezing


Cardiology: denies: chest pain, heart racing, light headedness, palpitations, 

passing out


Gastrointestinal: denies: abdominal pain, constipation, diarrhea


Musculoskeletal: denies: unstable gait, falls, neck pain





- Objective


Allergies/Adverse Reactions: 


 Allergies











Allergy/AdvReac Type Severity Reaction Status Date / Time


 


hydrochlorothiazide Allergy   Verified 03/09/20 05:03











 Current Medications





Acetaminophen (Tylenol)  650 mg PO Q4H PRN


   PRN Reason: Headache/Fever/Mild Pain (1-3)


   Last Admin: 06/04/20 13:36 Dose:  650 mg


Amoxicillin/Clavulanate Potassium (Augmentin)  875 mg PO Q12HR [facility]


   Last Admin: 06/05/20 08:44 Dose:  875 mg


Aspirin (Ecotrin)  81 mg PO DAILY [facility]


   Last Admin: 06/05/20 08:44 Dose:  81 mg


Atorvastatin Calcium (Lipitor)  40 mg PO QPM [facility]


   Last Admin: 06/04/20 20:41 Dose:  40 mg


Benzonatate (Tessalon)  100 mg PO TIDPRN PRN


   PRN Reason: Cough


   Last Admin: 06/02/20 09:31 Dose:  100 mg


Bisacodyl (Dulcolax)  10 mg PO DAILYPRN PRN


   PRN Reason: Constipation


   Last Admin: 06/02/20 20:25 Dose:  10 mg


Carvedilol (Coreg)  6.25 mg PO BID [facility]


   Last Admin: 06/05/20 08:44 Dose:  6.25 mg


Fluticasone Propionate (Flonase Nasal Spray)  0 gm NASAL DAILY [facility]


   Last Admin: 06/05/20 08:45 Dose:  1 spr


Furosemide (Lasix)  40 mg PO DAILY PRN


   PRN Reason: Edema


Furosemide (Lasix)  40 mg PO DAILY-AC [facility]


   Last Admin: 06/05/20 08:45 Dose:  40 mg


Ketorolac Tromethamine (Toradol)  30 mg IVP Q6H PRN


   PRN Reason: Pain


   Stop: 06/08/20 17:00


Morphine Sulfate (Morphine)  4 mg SLOW IVP Q4H PRN


   PRN Reason: Chest Pain


   Last Admin: 06/02/20 09:33 Dose:  4 mg


Nitroglycerin (Nitrostat)  0.4 mg PO Q5MIN PRN


   PRN Reason: Chest Pain


   Last Admin: 06/02/20 09:35 Dose:  0.4 mg


Pantoprazole Sodium (Protonix)  40 mg PO DAILY [facility]


   Stop: 07/03/20 09:01


   Last Admin: 06/05/20 08:44 Dose:  40 mg


Potassium Chloride (K-Dur)  40 meq PO NOW [facility]


   Stop: 06/05/20 11:15


   Last Admin: 06/05/20 10:28 Dose:  40 meq


Rivaroxaban (Xarelto)  20 mg PO HS [facility]


   Last Admin: 06/04/20 20:41 Dose:  20 mg


Sacubitril/Valsartan (Entresto 24 Mg-26 Mg Tablet)  1 tab PO BID MICHAEL


Sacubitril/Valsartan (Entresto 24 Mg-26 Mg Tablet)  1 tab PO NOW [facility]


   Stop: 06/05/20 12:00


   Last Admin: 06/05/20 10:28 Dose:  1 tab


Senna/Docusate Sodium (Senokot S)  2 tab PO BIDPRN PRN


   PRN Reason: Constipation


Sodium Chloride (Flush - Normal Saline)  10 ml IVF Q12HR PRN


   PRN Reason: Saline Flush


   Last Admin: 06/05/20 08:45 Dose:  10 ml


Sodium Chloride (Flush - Normal Saline)  10 ml IVF PRN PRN


   PRN Reason: Saline Flush


Sucralfate (Carafate)  1 gm PO ACHS [facility]


   Stop: 06/17/20 11:31


   Last Admin: 06/05/20 08:44 Dose:  1 gm








Vital Signs & Weight: 


 Vital Signs











  Temp Pulse Pulse Pulse Resp BP BP


 


 06/05/20 09:06    80  78    177/79 H


 


 06/05/20 08:44       136/87 


 


 06/05/20 08:00  98.4 F  87    24 H  


 


 06/05/20 03:41  98.2 F  60    18  


 


 06/05/20 00:16   82     














  BP BP Pulse Ox Pulse Ox Pulse Ox


 


 06/05/20 09:06  137/79    96  96


 


 06/05/20 08:44     


 


 06/05/20 08:00   136/87  95  


 


 06/05/20 03:41   133/70  93 L  


 


 06/05/20 00:16     








 











Weight                         242 lb 8 oz














I/O: 


 I/O











 06/04/20 06/05/20 06/06/20





 06:59 06:59 06:59


 


Intake Total 4700 1200 


 


Output Total  2025 


 


Balance 4700 -825 














- Quality Measures


Condition: Atrial Fibrillation/Flutter (hx or current) (lovenox)


CV meds: Xarelto: Yes





- Physical Exam


General: alert & oriented x3, appears well, no apparent distress, speech clear


HEENT: mucus membranes moist, normocephaly, EOMI


Neck: supple neck, midline trachea, no lymphadenopathy, JVD/HJR


Cardiology: regular rate and rhythm, PMI nondisplaced


Lungs: no wheezes, no rhonchi, bibasilar rales


Neurology: cranial nerve 2-12 intact, grossly intact, no lateralizing findings


Abdomen: unremarkable, active bowel sounds, no pulsations/bruits


Extremities: dry, strong pulses, warm


Skin: groin sites stable





- Labs


Result Diagrams: 


 06/05/20 09:54





 06/05/20 04:28





- EKG Interpretation


EKG Method: Telemetry


EKG shows: Sinus rhythm (frequent PVCs)





- Assessment/Plan


Assessment/Plan: 











1. Recurrent atrial arrhythmias:


    a. History of atrial flutter.


    b. Status post CTI ablation on 03/09/2018. 


    c. Recurrent atrial flutter, possibly typical isthmus dependent on 

presentation with rapid ventricular rate. s/p MARY JO with CV 6/1


    d. PVAI 6/4/20, 34 minutes


2. Coronary artery disease:


   a. Three-vessel disease, status post repeated bypass surgery, most recently 

in 2002. 


3. CHF with cardiomyopathy, likely ischemic:


    a. Reduced LVEF at 40% to 45% on echo 03/09/2020.


4. Peripheral vascular disease:


    a. Status post aortic abdominal aneurysm endovascular repair on 02/05/2018.


5. Hypertension and hyperlipidemia.


6. History of obstructive sleep apnea, on CPAP.


7. Elevated BMI greater than 30.


8. BPH and degenerative joint disease








No recurrent atrial fibrillation is seen but patient still appears to be in 

mild fluid overload. He is having frequent PVCs starting post PVAI, possibly 

related to his fluid overload as the burden has been subsiding over the past 24 

hrs.  Occasional PVC couplets and triplets are seen.  I would like to know his 

EF from his MARY JO this past monday.  It did appear depressed, as visualized on ICE

, during his ablation.  If his EF is low I would like him to be fitted for a 

lifevest, especially in light of his PVC burden. For now, I recommend titrating 

his Coreg up.  He may eventually need amiodarone if PVCs do not settle down.   

He seen to have a left bundle-branch block.  If his ejection fraction continues 

to decline he may eventually require biventricular pacing support possibly with 

defibrillator.

## 2020-06-05 NOTE — PDOC.CPN
- Subjective


Date: 06/05/20


Time: 11:38


Interval history: 





The pt seen and examined.  No overnight events.  No cardiac complaints. No SOB 

with walking today





- Objective


Allergies/Adverse Reactions: 


 Allergies











Allergy/AdvReac Type Severity Reaction Status Date / Time


 


hydrochlorothiazide Allergy   Verified 03/09/20 05:03











Visit Medications: 


 Current Medications





Acetaminophen (Tylenol)  650 mg PO Q4H PRN


   PRN Reason: Headache/Fever/Mild Pain (1-3)


   Last Admin: 06/04/20 13:36 Dose:  650 mg


Amoxicillin/Clavulanate Potassium (Augmentin)  875 mg PO Q12HR [facility]


   Last Admin: 06/05/20 08:44 Dose:  875 mg


Aspirin (Ecotrin)  81 mg PO DAILY [facility]


   Last Admin: 06/05/20 08:44 Dose:  81 mg


Atorvastatin Calcium (Lipitor)  40 mg PO QPM [facility]


   Last Admin: 06/04/20 20:41 Dose:  40 mg


Benzonatate (Tessalon)  100 mg PO TIDPRN PRN


   PRN Reason: Cough


   Last Admin: 06/02/20 09:31 Dose:  100 mg


Bisacodyl (Dulcolax)  10 mg PO DAILYPRN PRN


   PRN Reason: Constipation


   Last Admin: 06/02/20 20:25 Dose:  10 mg


Carvedilol (Coreg)  6.25 mg PO BID [facility]


   Last Admin: 06/05/20 08:44 Dose:  6.25 mg


Fluticasone Propionate (Flonase Nasal Spray)  0 gm NASAL DAILY [facility]


   Last Admin: 06/05/20 08:45 Dose:  1 spr


Furosemide (Lasix)  40 mg PO DAILY PRN


   PRN Reason: Edema


Furosemide (Lasix)  40 mg PO DAILY-AC [facility]


   Last Admin: 06/05/20 08:45 Dose:  40 mg


Ketorolac Tromethamine (Toradol)  30 mg IVP Q6H PRN


   PRN Reason: Pain


   Stop: 06/08/20 17:00


Morphine Sulfate (Morphine)  4 mg SLOW IVP Q4H PRN


   PRN Reason: Chest Pain


   Last Admin: 06/02/20 09:33 Dose:  4 mg


Nitroglycerin (Nitrostat)  0.4 mg PO Q5MIN PRN


   PRN Reason: Chest Pain


   Last Admin: 06/02/20 09:35 Dose:  0.4 mg


Pantoprazole Sodium (Protonix)  40 mg PO DAILY [facility]


   Stop: 07/03/20 09:01


   Last Admin: 06/05/20 08:44 Dose:  40 mg


Rivaroxaban (Xarelto)  20 mg PO [facility]


   Last Admin: 06/04/20 20:41 Dose:  20 mg


Sacubitril/Valsartan (Entresto 24 Mg-26 Mg Tablet)  1 tab PO BID MICHAEL


Sacubitril/Valsartan (Entresto 24 Mg-26 Mg Tablet)  1 tab PO NOW [facility]


   Stop: 06/05/20 12:00


   Last Admin: 06/05/20 10:28 Dose:  1 tab


Senna/Docusate Sodium (Senokot S)  2 tab PO BIDPRN PRN


   PRN Reason: Constipation


Sodium Chloride (Flush - Normal Saline)  10 ml IVF Q12HR PRN


   PRN Reason: Saline Flush


   Last Admin: 06/05/20 08:45 Dose:  10 ml


Sodium Chloride (Flush - Normal Saline)  10 ml IVF PRN PRN


   PRN Reason: Saline Flush


Sucralfate (Carafate)  1 gm PO ACHS [facility]


   Stop: 06/17/20 11:31


   Last Admin: 06/05/20 08:44 Dose:  1 gm








Vital Signs & Weight: 


 Vital Signs











  Temp Pulse Pulse Pulse Resp BP BP


 


 06/05/20 09:06    80  78    177/79 H


 


 06/05/20 08:44       136/87 


 


 06/05/20 08:00  98.4 F  87    24 H  


 


 06/05/20 07:45       


 


 06/05/20 03:41  98.2 F  60    18  


 


 06/05/20 00:16   82     














  BP BP Pulse Ox Pulse Ox Pulse Ox


 


 06/05/20 09:06  137/79    96  96


 


 06/05/20 08:44     


 


 06/05/20 08:00   136/87  95  


 


 06/05/20 07:45    96  


 


 06/05/20 03:41   133/70  93 L  


 


 06/05/20 00:16     








 











Weight                         242 lb 8 oz

















- Physical Exam


General: alert & oriented x3


HEENT: mucus membranes moist


Neck: supple neck


Cardiac: regular rate and rhythm, S1/S2


Lungs: decreased breath sounds


Extremities: no edema





- Labs


Result Diagrams: 


 06/05/20 09:54





 06/05/20 04:28


 Troponin/CKMB











CK-MB (CK-2)  8.3 ng/mL (0-6.6)  H*  05/31/20  02:03    


 


Troponin I  0.866 ng/mL (< 0.028)  H*  05/31/20  08:42    














- Telemetry


Sinus rhythms and dysrhythmias: sinus rhythm





- Assessment/Plan


Assessment/Plan: 





1. Recurrent Aflutter with RVR (s/p CTI in 03/2018) - s/p MARY JO/DCCV on 06/01/ 2020 and redo Aflutter RFA on 06/03/2020; remains in SR with Coreg 6.25mg BID; 

On Xarelto 20mg qd (he was on Eliquis 5mg BID in 2018 after s/p CIT ablation); H

&H today is stable


2. CAD with hx of CABG and s/p LHC in 03/2020 with patent graft with EF 45-50%, 

hypokinetic inferior wall and apex - No chest pain or SOB today


3. Chronic Systolic HF with EF 40-45% in 03/2020 - stable; on Lasix and Coreg 

3.125mg BID, which will be increased to 6.25mg BID from this PM; may resume 

Entresto once his BP is more stable


4. Ischemic CMY 


5. s/p AAA repair in 02/2018


6. HTN - will increase Coreg to 6.25mg BID from this PM


7. HLD - statin


8. Sleep apnea with Cpap


MAR reviewed





* Echo in 03/2020 with EF 40-45%, grade I dd, inferior wall hypokinesis, mod DEANDRA

, mild LAE, mild TR and ID


* From Cardiac standpoint, the pt is stable to d/c home and f/u with Dr Jauregui' 

office in 2 wks.





<Addendum> Will give another Laisx 20mg IV push this afternoon due to worsening 

of SOB this afternoon although he was doing well this AM with Lasix 40mg IV.  

He admitted that he has not watched his fluid and salt intakes during this 

hospitalization. Strongly instructed to watch strict fluid and salt intakes.





Pt. seen and eval. by me. He is ambulating in the halls. Still a little SOB, 

mild edema. Occasional PVC's.  No further Aflutter. He has a LBBB which is not 

new. The EF by MARY JO on 6/2/2020 was 35-40%. This may improve now that he is back 

in NSR. Resume entresto. replace potassium.Continue diuretics. gjm

## 2020-06-06 NOTE — EKG
Test Reason : 

Blood Pressure : ***/*** mmHG

Vent. Rate : 125 BPM     Atrial Rate : 202 BPM

   P-R Int : 000 ms          QRS Dur : 140 ms

    QT Int : 380 ms       P-R-T Axes : 000 -19 210 degrees

   QTc Int : 548 ms

 

Wide QRS tachycardia with Premature ventricular complexes or Fusion complexes

Left bundle branch block

Abnormal ECG

 

Confirmed by GOLDBERG M.D., ADRIANA ([id number]),  SARAN REYNOSO ([id number]) on 6/6/2020 1:51:41 PM

 

Referred By:             Confirmed By:ADRIANA GOLDBERG M.D.

## 2020-06-06 NOTE — DIS
DATE OF ADMISSION:  2020



DATE OF DISCHARGE:  2020



DISCHARGE DIAGNOSES:  As of the followin. Chest pain.

2. Atrial flutter.

3. Elevated troponins, most likely secondary to demand-related, type 2.

4. Hypertension.

5. Obstructive sleep apnea.



HOSPITAL COURSE:  The patient is a 79-year-old male, who initially was admitted to

the hospital on , with complaints of chest pain.  At this time, the patient was

found to have heart rate up in the 120s, appeared to be atrial flutter.  The patient

was given medications to control his rate, and EP was consulted.  The patient

initially underwent a cardioversion.  This was done on .  The patient then

underwent an ablation by Electrophysiology.  The patient underwent an ablation on

.  The patient's hospital course was complicated with some shortness of breath

and was found to have fluid overload.  He was given some IV Lasix, which improved

his shortness of breath.  He denied any chest pain or palpitations after that.  The

patient was started on anticoagulation.  He did have some dark tarry stools, which

were checked, and the occult was negative.  He was supposed to be discharged on

Friday, however, started having more PVCs and was found to have some low potassium.

His potassium was replaced, and at this time, he was okay to be discharged.  He was

not seen by EP today; however, he was seen by Cardiology, who stated that he was

okay to be discharged.  He will be discharged.  He will follow up with his primary

and also with EP and Cardiology. 



MEDICATIONS:  He will be on:

1. Aspirin 81 mg daily.

2. Pradaxa 150 mg twice a day.

3. Augmentin 875 one p.o. b.i.d.

4. Coreg 6.25 twice a day, which has been titrated up.

5. Protonix 40 mg daily.

6. Carafate 1 g a.c. and at bedtime.

7. Lasix 20 mg daily.

8. Isosorbide 30 mg twice a day.

9. Clonidine 1 tablet b.i.d. p.r.n.

10. Entresto 2 tablets twice a day.

11. Atorvastatin 1 tablet daily.

12. Tamsulosin 0.4 one p.o. at bedtime.

13. Folic acid 1 p.o. daily.



PHYSICAL EXAMINATION:

VITAL SIGNS:  On discharge, temperature of 98.3, heart rate 74, respiratory rate 16,

O2 saturation 95% on room air, blood pressure 139/75. 

GENERAL:  He is awake, alert, and oriented x3, does not appear in pain or distress. 

CV:  S1 and S2 present.  No murmurs, rubs, or gallops. 

ABDOMEN:  Soft and nontender.  Bowel sounds are present x2. 



His potassium has been replaced.  He has been encouraged to take his Entresto, which

most likely will increase his potassium given the ACE inhibitor.  Also, he was

encouraged to eat a banana for the next couple of days.  The patient will be

discharged home.  He will follow up with his primary and Cardiology. 







Job ID:  [id number]

## 2020-06-24 NOTE — OP
DATE OF PROCEDURE:  06/24/2020



PROCEDURE PERFORMED:  Cardioversion.



REASON FOR PROCEDURE:  Mr. Mantilla is a 79-year-old man with history of 
persistent

atrial fibrillation, cardiomyopathy, left bundle-branch block.  He underwent a

pulmonary venous isolation procedure about 3 weeks ago, but now returns with a 
new

onset of rapid heartbeats.  The EKG is suggestive of 2:1 conducted atrial 
flutter

and continued left bundle-branch block. 



The patient has been well anticoagulated without interruption since ablation 
and we

are planning a cardioversion. 



DESCRIPTION OF PROCEDURE:  The patient received propofol by Anesthesia 
specialist.

After adequate level of sedation achieved, a synchronized 100-joule shock 
promptly

converted the patient back to sinus bradycardia, initially slow ventricular 
rates,

responded to transcutaneous pacing for a couple of seconds only.  Then, heart 
rates

were seen in the 60 range. 



CONCLUSION:  Successful cardioversion.



PLAN:  Continue oral anticoagulation.  Consider antiarrhythmic agents, though 
he may

require pacing for sinus node dysfunction if that is the case.  We will 
evaluate echocardiogram 

to assess his LV function as well. 







Job ID:  [id number]



MTDD

## 2020-06-24 NOTE — PDOC.HHP
Hospitalist HPI





- History of Present Illness


tachycardia


History of Present Illness: 








This is a 79 year old male with history of atrial flutter s/p ablation 6/3 who 

presented to the ER with tachycardia. The patient states that yesterday he was 

checking his blood pressure at home and noticed his heart rate was fast. He 

checked it several times during the day and it was persistently greater than 

113. He had one low blood pressure of 87/22 ,but most of his blood pressures 

ranged from 106-163 systolic. The patient states he was taking 3.125 mg coreg 

twice daily due to a low heart rate; of note the patient was discharged on 6/3 

with coreg 6.125 mg bid.  He denied chest pain, palpitations, shortness of 

breath, cough, fevers or chills. He states he has been having diarrhea 7-8 

times daily ever since he was discharged from the hospital three weeks ago. He 

has no abdominal pain.  No history of thyroid problems. 


ED Course: 





The patient went to an outside ER. He was found to have BP 90/60, heart rate 

115.  EKG showed sinus tachycardia with left bundle branch block.  Troponin I 

was normal, coronavirus was not detected, BNP was 198. CBC showed Hb 13.1, Hct 

38.4. INR was 1.3. BMP was normal.  Chest X ray was normal.   The patient was 

given aspirin 324 mg. He was given a 1L bolus and transferred here. 








Hospitalist ROS





- Review of Systems


Constitutional: denies: fever


ENT: denies: ear pain, ear discharge


Respiratory: denies: cough, dry, shortness of breath


Cardiovascular: denies: chest pain, palpitations, orthopnea


Gastrointestinal: reports: diarrhea.  denies: nausea, vomiting, abdominal pain


Genitourinary: denies: dysuria, frequency


Musculoskeletal: denies: neck pain, shoulder pain


Neurological: denies: weakness, numbness, seizures





- Medication


Medications: 


Active Medications











Generic Name Dose Route Start Last Admin





  Trade Name Freq  PRN Reason Stop Dose Admin


 


Aspirin  81 mg  06/24/20 09:00  06/24/20 08:56





  Ecotrin  PO   81 mg





  QAM MICHAEL   Administration





     





     





     





     


 


Carvedilol  3.125 mg  06/24/20 09:00  06/24/20 08:56





  Coreg  PO   3.125 mg





  BID MICHAEL   Administration





     





     





     





     


 


Ezetimibe  10 mg  06/24/20 09:00  06/24/20 08:56





  Zetia  PO   10 mg





  DAILY MICHAEL   Administration





     





     





     





     


 


Pantoprazole Sodium  40 mg  06/24/20 09:00  06/24/20 08:56





  Protonix  PO   40 mg





  DAILY MICHAEL   Administration





     





     





     





     


 


Sodium Chloride  10 ml  06/24/20 09:00  06/24/20 08:56





  Flush - Normal Saline  IVF   10 ml





  Q12HR MICHAEL   Administration





     





     





     





     


 


Sucralfate  1 gm  06/24/20 07:30  06/24/20 08:56





  Carafate  PO   1 gm





  ACHS MICHAEL   Administration





     





     





     





     














Hospitalist History





- Past Medical History


Other Medical History: 





CAD


Hypertension


Atrial fib/atrial flutter


Hyperlipidemia





- Past Surgical History


Other Surgical History: 





CAD s/p CABG (quadruple bypass) x 2


Aneurysm abdomen repaired


Knee and ankle replacement


Ablation two years ago and on 06/3








- Family History


Other Family History: 





No family history of heart problems








- Social History


Smoking Status: Never smoker


Alcohol: reports: None


Drugs: reports: none


Living Situation: Other ( for 60+ years.)





- Exam


General Appearance: NAD, awake alert


Eye: PERRL, anicteric sclera


ENT: normocephalic atraumatic, no oropharyngeal lesions


Neck: no JVD


Heart: no murmur, no gallops, no rubs, irregular


Respiratory: CTAB, no wheezes, no rales, no ronchi


Gastrointestinal: soft, non-tender, non-distended, normal bowel sounds


Extremities: no cyanosis, no clubbing, no edema





Hospitalist Results





- Labs


Result Diagrams: 


 06/24/20 09:59





 06/24/20 09:59





Hospitalist H&P A/P





- Plan


Plan: 








This is a 79 year old male who presented with tachhycardia, had recent ablation 

on 6/3











Tachycardia likely from uncontrolled afib/flutter


- on exam patient has irregular rate


- Dr. Brumfield consulted, pending recommendations


- check CBc, BMP, magnesium level, TSH


- continue pradaxa


- will give 500 cc bolus 





Diarrhea


- check stool cultures


- check TSH 


- check C diff given patient was on augmentin during last hospitalization








CAD s/p CABG


- continue aspirin and pradaxa and statin 








History of systolic heart failure 


Hypertension


- hold entresto for now, hold imdur


- hold lasix








BPH


- continue flomax











COde status: full code

## 2020-06-25 NOTE — EKG
Test Reason : 

Blood Pressure : ***/*** mmHG

Vent. Rate : 115 BPM     Atrial Rate : 107 BPM

   P-R Int : 000 ms          QRS Dur : 158 ms

    QT Int : 376 ms       P-R-T Axes : 000 001 205 degrees

   QTc Int : 520 ms

 

Wide QRS rhythm with occasional Premature ventricular complexes

Left bundle branch block

Abnormal ECG

When compared with ECG of 05-JUN-2020 11:50, (Unconfirmed)

Wide QRS rhythm has replaced Sinus rhythm

Vent. rate has increased BY  41 BPM

Confirmed by DR. ROSITA MERCER ([id number]) on 6/25/2020 5:43:05 PM

 

Referred By:  [facility]           Confirmed By:DR. ROSITA MERCER

## 2020-06-25 NOTE — CON
DATE OF CONSULTATION:  06/24/2020



Dictated by Polly Jose, nurse practitioner, as a scribe for Dr. Richie Brumfield.

Consultation performed by Dr. Richie Brumfield. 



REASON FOR CONSULTATION:  Atrial fibrillation.



HISTORY OF PRESENT ILLNESS:  Mr. Mantilla is a 79-year-old gentleman with a 
history

of atrial arrhythmias.  He had recently undergone left atrial ablation and 
pulmonary

venous isolation on 06/03/2020.  In the remote past, he had undergone typical 
atrial

flutter ablation in 2018.  He was discharged earlier this month post ablation.  
He

had been seen to have some ventricular ectopy and was borderline bradycardic.  
There

was some restriction on increasing his Coreg due to his bradycardia.  He was 
also

seen to have a left bundle-branch block.  He presented to the emergency room 
with

tachycardia and was found to have early recurrence of atrial arrhythmias 
following

his recent ablation during inflammatory phase.  He reported some low blood 
pressure.

 He also endorses some diarrhea since his recent hospitalization. 



Mr. Mantilla is currently feeling fairly well.  He does endorse some heart 
racing,

palpitation, some dizziness, and diarrhea.  He denies any chest pain, syncope, 
near

syncope, stroke, or stroke-like symptoms.  He is not having any bleeding 
dyscrasias.

 He is tolerating his anticoagulation with Pradaxa well. 



REVIEW OF SYSTEMS:  Otherwise, 12-point review of systems is unremarkable.



PROBLEM LIST/PAST MEDICAL HISTORY:  

1. Recurrent atrial arrhythmias:

    a. Typical atrial flutter, status post CTI ablation on 03/09/2018.

    b. Atrial fibrillation, status post PVAI 06/03/2020 with early recurrence.

2. Coronary artery disease:

    a. Three vessel disease, status post repeated bypass most recently in 2002.

3. CHF with cardiomyopathy, likely ischemic:

    a. Moderately reduced LVEF of 40% to 45% by echo on 03/09/2020.

4. Peripheral vascular disease:

    a. Status post AAA endovascular repair on 02/05/2018.

5. Hypertension.

6. Hyperlipidemia.

7. CLAY, on CPAP.

8. Elevated BMI greater than 30.

9. BPH.

10. Degenerative joint disease.



ALLERGIES:  HYDROCHLOROTHIAZIDE.



HOME MEDICATIONS:  

1. Tamsulosin at bedtime.

2. Vitamin C at bedtime.

3. Imdur 30 mg b.i.d.

4. Lipitor q.p.m.

5. Aspirin daily 81 mg.

6. Tylenol as needed.

7. CoQ10 q.p.m.

8. Carafate 1 g before meals and at bedtime post ablation x2 weeks.

9. Potassium 20 mEq daily.

10. Lasix 40 mg daily.

11. Folic acid 0.8 mg at bedtime.

12. Ezetimibe 10 mg daily.

13. Pradaxa 150 mg b.i.d.

14. Carvedilol 3.125 mg b.i.d.

15. Entresto 49/51 mg b.i.d.

16. Clonidine b.i.d. p.r.n.



FAMILY HISTORY:  Positive for CAD.



SOCIAL HISTORY:  Lives in [place] with his wife at home.  Ambulates without

assistance.  Never smoked.  Denies alcohol or drug abuse.  Remote history of 
tobacco

over 40 years ago. 



OBJECTIVE:  VITAL SIGNS: Temperature 96.5 degrees Fahrenheit, pulse 113, blood

pressure 109/65, respirations 18, and oxygen 95% on room air. 

GENERAL:  The patient is alert and oriented. Speech is clear.  Affect is

appropriate.  He is in no apparent distress at the time of the exam, he is 
resting

comfortably in bed. 

HEENT:  He is normocephalic and atraumatic.  Sclerae are anicteric.  EOMs are

intact.  Oral mucosa is moist and pink with adequate dentition. 

NECK:  Supple.  Mild jugular venous distention in a semi-recumbent position. 

LUNGS:  Clear to auscultation in the upper fields with some fine bibasilar 
crackles. 

HEART:  Rate is irregularly irregular and tachycardic.  PMI nondisplaced. 

ABDOMEN:  Obese, soft, and nontender without palpable masses. 

EXTREMITIES: Warm and dry to touch.  Well perfused without clubbing or cyanosis.

Trace edema is noted in bilateral lower extremities. 

NEUROLOGIC:  Grossly intact and nonfocal.  Gait was not assessed.



DATABASE:  Telemetry and EKG show tachycardic, atrial arrhythmia, likely 2:1 
atrial

flutter.  There is a pre-existing left bundle-branch block, which is likely 
hiding

one of his atrial flutter P waves.  His baseline sinus rhythm is in the 50s and 
his

current tachycardia likely represents 2:1 flutter. 



LABORATORY DATA:  Hematology is unremarkable.  Chemistry; sodium 135, potassium 
4.4,

creatinine 0.89, magnesium 2.3.  TSH 1.1 and free T4 is 1.03. 



IMPRESSION:  

1. Early recurrence of atrial arrhythmias following PVI on 06/03/2020.

2. Tachy-bridget syndrome.

3. Left bundle-branch block.

4. Chronic systolic congestive heart failure with nonischemic cardiomyopathy, 
LVEF

35% with no fluid overload. 

5. History of PVCs.

6. Oral anticoagulation with Pradaxa for elevated CHADS-VASc score and post 
ablation.



PLAN AND RECOMMENDATIONS:  Mr. Mantilla appears to be in early recurrence of 
what

appears to be 2:1 atrial flutter.  Early recurrent arrhythmias are not uncommon 
this

close following ablation.  I have ordered a repeat EKG to see if there is any

additional clarity that can be provided to the arrhythmia, though his P waves 
will

be difficult to see with his bundle branch block if he is in a 2:1 flutter.

Ultimately, he would most benefit from a cardioversion as he is moderately

symptomatic with his atrial arrhythmias.  He has some borderline hypotension, 
so I

will check a transthoracic echocardiogram to rule out any post ablation 
pericardial

effusion.  He may require antiarrhythmic support for suppression of his atrial

arrhythmias during his recovery period post ablation.  With his tachy-bridget

syndrome, this does present a challenge.  We have been limited with further

titrating his beta blockers due to his underlying bradycardia.  We could 
consider

Tikosyn loading following the cardioversion if bradycardia is seen versus

biventricular pacing, possibly defibrillator implant due to his cardiomyopathy.

Amiodarone would be a consideration, though this would likely require the 
support of

a pacemaker to prevent worsening bradycardia. 



Also plan to obtain repeat 2D ECHO.  Should he have persistently reduced LVEF 
and 

signs of LV disynchrony, may benefit from BIV ICD implant.



Thank you for allowing me to participate in the care of this patient.  We will 
keep

him n.p.o. today in preparation for cardioversion later today, schedule 
permitting.

The patient is aware of the plan.  He understands the risks associated with the

cardioversion.  He is agreeable to proceed. 







Job ID:  [id number]



[facility]

## 2020-06-25 NOTE — PDOC.EP
- Subjective


Date: 06/25/20


Time: 08:00


Interval History: 





 follow-up regarding atrial fibrillation as well as left bundle-branch block, 

bradycardia and cardiomyopathy.





  Patient underwent cardioversion yesterday.  Repeat echocardiogram was 

performed recently.  He is resting comfortably in bed this morning and voices 

no acute complaints.  He overall feels tired and reports diarrhea





- Review of Systems


Constitutional: denies: chills, fever, sweats, weakness


Respiratory: denies: cough, shortness of breath, wheezing


Cardiology: denies: chest pain, heart racing, light headedness, palpitations, 

passing out


Gastrointestinal: reports: diarrhea.  denies: abdominal pain, melena, nausea, 

vomitting


Musculoskeletal: denies: unstable gait, neck pain





- Objective


Allergies/Adverse Reactions: 


 Allergies











Allergy/AdvReac Type Severity Reaction Status Date / Time


 


hydrochlorothiazide Allergy   Verified 03/09/20 05:03











 Current Medications





Acetaminophen (Tylenol)  650 mg PO Q4H PRN


   PRN Reason: Headache/Fever/Mild Pain (1-3)


Hydrocodone Bitart/Acetaminophen (Norco 5/325)  1 tab PO Q4H PRN


   PRN Reason: Moderate Pain (4-6)


Ascorbic Acid (Vitamin C)  500 mg PO DAILY Formerly [facility]


   Last Admin: 06/25/20 07:58 Dose:  500 mg


Aspirin (Ecotrin)  81 mg PO DAILY Formerly [facility]


   Last Admin: 06/25/20 07:58 Dose:  81 mg


Atorvastatin Calcium (Lipitor)  40 mg PO QPM Formerly [facility]


   Last Admin: 06/24/20 21:10 Dose:  40 mg


Bisacodyl (Dulcolax)  10 mg MA DAILYPRN PRN


   PRN Reason: Constipation


Calcium Carbonate (Tums)  1,000 mg PO Q4H PRN


   PRN Reason: Heartburn  or Indigestion


Carvedilol (Coreg)  3.125 mg PO BID Formerly [facility]


   Last Admin: 06/25/20 07:58 Dose:  3.125 mg


Clonidine (Catapres)  0.1 mg PO BID PRN


   PRN Reason: Blood Pressure


Coenzyme Q10 (Coenzyme Q10)  100 mg PO QPM Formerly [facility]


   Last Admin: 06/24/20 21:10 Dose:  100 mg


Dabigatran (Pradaxa)  150 mg PO BID Formerly [facility]


   Last Admin: 06/25/20 07:58 Dose:  150 mg


Ezetimibe (Zetia)  10 mg PO DAILY Formerly [facility]


   Last Admin: 06/25/20 07:58 Dose:  10 mg


Folic Acid (Folvite)  1 mg PO HS Formerly [facility]


   Last Admin: 06/24/20 21:11 Dose:  1 mg


Guaifenesin/Dextromethorphan (Robitussin Dm)  15 ml PO Q4H PRN


   PRN Reason: Cough


Ondansetron HCl (Zofran)  4 mg IVP Q6H PRN


   PRN Reason: Nausea/Vomiting


Pantoprazole Sodium (Protonix)  40 mg PO DAILY Formerly [facility]


   Last Admin: 06/25/20 07:58 Dose:  40 mg


Senna/Docusate Sodium (Senokot S)  2 tab PO BID PRN


   PRN Reason: Constipation


Sodium Chloride (Flush - Normal Saline)  10 ml IVF Q12HR Formerly [facility]


   Last Admin: 06/25/20 07:59 Dose:  10 ml


Sodium Chloride (Flush - Normal Saline)  10 ml IVF PRN PRN


   PRN Reason: Saline Flush


Sucralfate (Carafate)  1 gm PO ACHS Formerly [facility]


   Last Admin: 06/25/20 11:22 Dose:  1 gm


Tamsulosin HCl (Flomax)  0.4 mg PO HS Former[facility]


   Last Admin: 06/24/20 21:11 Dose:  0.4 mg








Vital Signs & Weight: 


 Vital Signs











  Temp Pulse Pulse Pulse Resp BP BP


 


 06/25/20 15:20  97.7 F  61    18  


 


 06/25/20 11:23  97.6 F  66    18  


 


 06/25/20 09:45    60  65   132/78  150/75 H


 


 06/25/20 07:12  97.8 F  69    18  














  BP BP Pulse Ox


 


 06/25/20 15:20  158/82 H   99


 


 06/25/20 11:23   124/62  98


 


 06/25/20 09:45   


 


 06/25/20 07:12  155/78 H   97








 











Admit Weight                   234 lb 9.6 oz


 


Weight                         234 lb 9.6 oz














I/O: 


 I/O











 06/24/20 06/25/20 06/26/20





 06:59 06:59 06:59


 


Intake Total  1350 


 


Output Total  800 


 


Balance  550 














- Quality Measures


Condition: Atrial Fibrillation/Flutter (hx or current) ( Pradaxa)





- Physical Exam


General: alert & oriented x3, appears well, no apparent distress, speech clear, 

affect appropriate


HEENT: mucus membranes moist, normocephaly


Neck: supple neck, midline trachea, no JVD/HJR, no masses, no bruit, no 

lymphadenopathy, no thromegaly


Cardiology: regular rate and rhythm, no murmur, regular rate, regular rhythm, 

PMI nondisplaced


Lungs: clear to auscultation, normal breath sounds, no wheeze, rales, rhonchi


Neurology: cranial nerve 2-12 intact, grossly intact, no lateralizing findings


Abdomen: unremarkable, active bowel sounds, no pulsations/bruits


Extremities: dry, strong pulses, warm





- Chadsvasc Risk factors


Congestive heart failure: 1


Hypertension: 1


Age >75: 2


Risk Score: 4





- Labs


Result Diagrams: 


 06/25/20 04:24





 06/25/20 04:24





- EKG Interpretation


EKG Method: Telemetry


EKG shows: Sinus rhythm





- Assessment/Plan


Assessment/Plan: 





1. Early recurrence of atrial arrhythmias following PVI on 06/03/2020.


2. Tachy-bridget syndrome.


3. Left bundle-branch block.


4. Chronic systolic congestive heart failure with nonischemic cardiomyopathy,


   - previously LVEF 35% with no fluid overload. 


   - LVEF <20% by echo this stay, possibly worsened by AF,RVR


5. History of PVCs.


6. Oral anticoagulation with Pradaxa for elevated CHADS-VASc score and post 

ablation.





Mr. Mantilla experienced early recurrence of atrial fibrillation following his 

recent ablation.  Unfortunately medical management options are extremely 

limited with him due to his underlying bradycardia.  He has limited 

antiarrhythmic options,  many of which would worsen his bradycardia.   He also 

has cardiomyopathy and a significant left bundle branch block greater than 200 

milliseconds.   My recommendation is he undergo biventricular ICD implant.   

This would correct the bradycardia as well as the ventricular dyssynchrony.  We 

would proceed with defibrillator implant as well to prevent re operation in the 

near future.  We discussed the risks benefits and alternatives, including a 

pacemaker versus defibrillator therapies.  He voices understanding and is 

willing to proceed.   I will hold anticoagulation in anticipation of ICD 

implant tomorrow morning, NPO after midnight except medications in the morning 

with a sip of water.

## 2020-06-25 NOTE — PDOC.HOSPP
- Subjective


Encounter Date: 06/25/20


Encounter Time: 15:00


Subjective: 





Patient seen and examined for atrial tachycardia. No CP. s/p CV. No new 

complaints. No overnight events





- Objective


Vital Signs & Weight: 


 Vital Signs (12 hours)











  Temp Pulse Resp BP Pulse Ox


 


 06/25/20 07:12  97.8 F  69  18  155/78 H  97


 


 06/25/20 04:09  97.4 F L  74  14  147/84 H  96


 


 06/25/20 00:38      96








 Weight











Weight                         234 lb 9.6 oz














I&O: 


 











 06/24/20 06/25/20 06/26/20





 06:59 06:59 06:59


 


Intake Total  1350 


 


Output Total  800 


 


Balance  550 











Result Diagrams: 


 06/25/20 04:24





 06/25/20 04:24


EKG Reviewed by me: Yes (Tele SR/SB)





Hospitalist ROS





- Review of Systems


Respiratory: denies: cough, dry, shortness of breath, hemoptysis, SOB with 

excertion, pleuritic pain, sputum, wheezing, other


Cardiovascular: denies: chest pain, palpitations, orthopnea, paroxysmal noc. 

dyspnea, edema, light headedness, other





- Medication


Medications: 


Active Medications











Generic Name Dose Route Start Last Admin





  Trade Name Freq  PRN Reason Stop Dose Admin


 


Ascorbic Acid  500 mg  06/25/20 09:00  06/25/20 07:58





  Vitamin C  PO   500 mg





  DAILY MICHAEL   Administration





     





     





     





     


 


Aspirin  81 mg  06/24/20 09:00  06/25/20 07:58





  Ecotrin  PO   81 mg





  DAILY MICHAEL   Administration





     





     





     





     


 


Atorvastatin Calcium  40 mg  06/24/20 21:00  06/24/20 21:10





  Lipitor  PO   40 mg





  QPM MICHAEL   Administration





     





     





     





     


 


Carvedilol  3.125 mg  06/24/20 09:00  06/25/20 07:58





  Coreg  PO   3.125 mg





  BID MICHAEL   Administration





     





     





     





     


 


Coenzyme Q10  100 mg  06/24/20 21:00  06/24/20 21:10





  Coenzyme Q10  PO   100 mg





  QPM MICHAEL   Administration





     





     





     





     


 


Dabigatran  150 mg  06/24/20 21:00  06/25/20 07:58





  Pradaxa  PO   150 mg





  BID MICHAEL   Administration





     





     





     





     


 


Ezetimibe  10 mg  06/24/20 09:00  06/25/20 07:58





  Zetia  PO   10 mg





  DAILY MICHAEL   Administration





     





     





     





     


 


Folic Acid  1 mg  06/24/20 21:00  06/24/20 21:11





  Folvite  PO   1 mg





  HS MICHAEL   Administration





     





     





     





     


 


Pantoprazole Sodium  40 mg  06/24/20 09:00  06/25/20 07:58





  Protonix  PO   40 mg





  DAILY MICHAEL   Administration





     





     





     





     


 


Sodium Chloride  10 ml  06/24/20 09:00  06/25/20 07:59





  Flush - Normal Saline  IVF   10 ml





  Q12HR MICHAEL   Administration





     





     





     





     


 


Sucralfate  1 gm  06/24/20 07:30  06/25/20 07:58





  Carafate  PO   1 gm





  ACHS MICHAEL   Administration





     





     





     





     


 


Tamsulosin HCl  0.4 mg  06/24/20 21:00  06/24/20 21:11





  Flomax  PO   0.4 mg





  HS MICHAEL   Administration





     





     





     





     














- Exam


General Appearance: NAD


Heart: RRR, no gallops


Respiratory: no wheezes, no ronchi


Gastrointestinal: non-tender, non-distended, normal bowel sounds


Extremities: no cyanosis, no clubbing


Neurological: no new deficit





Hosp A/P





- Plan





Atrial fib/flutter


-s/p CV with transient bradycardia requiring pacing





Diarrhea


-?etio





Chronic systolic heart failure





CAD s/p CABG





Aortic stenosis





HTN





Tachy-Reji syndrome





Obesity BMI 30.1





BPH





PLAN:


Dual chamber ICD in AM


Await C diff studies


Cont ASA


Cont Coreg


Cont Anticoagulation


Cont other meds as above


Full code


DPOA spouse

## 2020-06-26 NOTE — RAD
EXAM: Single view of the chest



HISTORY:   Pacemaker placement



COMPARISON: 6/2/2020



FINDINGS: Single view of the chest shows an enlarged but stable cardiomediastinal silhouette.  The pa
tient is status post sternotomy. A triple lead pacemaker is seen with its leads in the right

atrium, right ventricle, and coronary sinus. No pneumothorax is seen.  There is no evidence of consol
idation, mass, or pleural effusion. The bones are unremarkable.



IMPRESSION: Status post pacemaker placement without evidence of complication.



Reported By: Phil Chew 

Electronically Signed:  6/26/2020 10:06 AM

## 2020-06-26 NOTE — EKG
Test Reason : 

Blood Pressure : ***/*** mmHG

Vent. Rate : 065 BPM     Atrial Rate : 065 BPM

   P-R Int : 160 ms          QRS Dur : 130 ms

    QT Int : 432 ms       P-R-T Axes : -17 -11 169 degrees

   QTc Int : 449 ms

 

Sinus rhythm with   Premature atrial complexes

Left bundle branch block

Abnormal ECG

When compared with ECG of 24-JUN-2020 08:10, (Unconfirmed)

Sinus rhythm has replaced Wide QRS rhythm

Vent. rate has decreased BY  50 BPM

Confirmed by DR. ROSITA MERCER ([id number]) on 6/26/2020 8:32:36 AM

 

Referred By:  ARLETTE           Confirmed By:DR. ROSITA MERCER

## 2020-06-26 NOTE — DIS
DATE OF ADMISSION:  06/24/2020



DATE OF DISCHARGE:  06/26/2020



DISCHARGE DISPOSITION:  Home.



FOLLOWUP:  

1. Follow up with primary care physician, Dr. Bipin Hudson, in 1 week.

2. Follow up with Electrophysiology, Dr. Brumfield, and Cardiology as scheduled. 



The patient was advised to restart Pradaxa tomorrow. 



The patient was seen on the day of discharge.  Denies any new complaints.  No chest

pain, shortness of breath, or palpitations reported. 



INPATIENT CONSULTANT:  Electrophysiology, Dr. Brumfield.



DISCHARGE MEDICATIONS:  

1. Same as admission medications.

2. The patient was advised to resume Pradaxa tomorrow evening.



BRIEF HOSPITAL COURSE:  The patient is a 79-year-old male, who underwent ablation

for atrial flutter earlier this month, presented to the hospital with

tachyarrhythmia.  His workup was consistent with atrial tachycardia.  He underwent

cardioversion.  Post cardioversion, the patient remained in bradycardia requiring

dual-chamber pacemaker placement.  Repeat echocardiogram this admission showed

ejection fraction approximately 20% with moderate-to-severe aortic stenosis,

mild-to-moderate tricuspid regurgitation, and mild pulmonary regurgitation.  He has

been cleared by Electrophysiology for discharge. 



FINAL DIAGNOSES:  

1. Atrial tachycardia, status post cardioversion with transient bradycardia

requiring temporary pacing. 

2. Tachy-bridget syndrome, status post dual-chamber pacemaker placement this

admission.  Operative report pending at the time of discharge. 

3. Diarrhea, resolved.

4. Chronic systolic heart failure.

5. Coronary artery disease, status post coronary artery bypass grafting.

6. Aortic stenosis.

7. Hypertension.

8. Obesity with a body mass index of 30.1.

9. Benign prostatic hypertrophy.

10. History of premature ventricular contractions.

11. Chronic anticoagulation.

12. Left bundle-branch block. 



The patient understands the above plan of care.







Job ID:  [id number]